# Patient Record
Sex: MALE | Race: BLACK OR AFRICAN AMERICAN | NOT HISPANIC OR LATINO | URBAN - METROPOLITAN AREA
[De-identification: names, ages, dates, MRNs, and addresses within clinical notes are randomized per-mention and may not be internally consistent; named-entity substitution may affect disease eponyms.]

---

## 2019-03-27 ENCOUNTER — INPATIENT (INPATIENT)
Facility: HOSPITAL | Age: 74
LOS: 5 days | Discharge: HOME | DRG: 395 | End: 2019-04-02
Attending: HOSPITALIST | Admitting: HOSPITALIST
Payer: COMMERCIAL

## 2019-03-27 VITALS
RESPIRATION RATE: 18 BRPM | DIASTOLIC BLOOD PRESSURE: 89 MMHG | SYSTOLIC BLOOD PRESSURE: 142 MMHG | HEIGHT: 67 IN | HEART RATE: 89 BPM | TEMPERATURE: 98 F | WEIGHT: 199.96 LBS | OXYGEN SATURATION: 98 %

## 2019-03-27 DIAGNOSIS — I10 ESSENTIAL (PRIMARY) HYPERTENSION: ICD-10-CM

## 2019-03-27 DIAGNOSIS — R22.9 LOCALIZED SWELLING, MASS AND LUMP, UNSPECIFIED: ICD-10-CM

## 2019-03-27 DIAGNOSIS — Z29.9 ENCOUNTER FOR PROPHYLACTIC MEASURES, UNSPECIFIED: ICD-10-CM

## 2019-03-27 DIAGNOSIS — N40.0 BENIGN PROSTATIC HYPERPLASIA WITHOUT LOWER URINARY TRACT SYMPTOMS: ICD-10-CM

## 2019-03-27 DIAGNOSIS — E11.9 TYPE 2 DIABETES MELLITUS WITHOUT COMPLICATIONS: ICD-10-CM

## 2019-03-27 DIAGNOSIS — K63.9 DISEASE OF INTESTINE, UNSPECIFIED: ICD-10-CM

## 2019-03-27 DIAGNOSIS — I48.91 UNSPECIFIED ATRIAL FIBRILLATION: ICD-10-CM

## 2019-03-27 LAB
ALBUMIN SERPL ELPH-MCNC: 3.8 G/DL — SIGNIFICANT CHANGE UP (ref 3.5–5)
ALP SERPL-CCNC: 66 U/L — SIGNIFICANT CHANGE UP (ref 40–120)
ALT FLD-CCNC: 26 U/L DA — SIGNIFICANT CHANGE UP (ref 10–60)
ANION GAP SERPL CALC-SCNC: 7 MMOL/L — SIGNIFICANT CHANGE UP (ref 5–17)
APTT BLD: 40.6 SEC — HIGH (ref 27.5–36.3)
AST SERPL-CCNC: 16 U/L — SIGNIFICANT CHANGE UP (ref 10–40)
BASOPHILS # BLD AUTO: 0.02 K/UL — SIGNIFICANT CHANGE UP (ref 0–0.2)
BASOPHILS NFR BLD AUTO: 0.3 % — SIGNIFICANT CHANGE UP (ref 0–2)
BILIRUB SERPL-MCNC: 1.2 MG/DL — SIGNIFICANT CHANGE UP (ref 0.2–1.2)
BUN SERPL-MCNC: 13 MG/DL — SIGNIFICANT CHANGE UP (ref 7–18)
CALCIUM SERPL-MCNC: 9.1 MG/DL — SIGNIFICANT CHANGE UP (ref 8.4–10.5)
CHLORIDE SERPL-SCNC: 109 MMOL/L — HIGH (ref 96–108)
CO2 SERPL-SCNC: 25 MMOL/L — SIGNIFICANT CHANGE UP (ref 22–31)
CREAT SERPL-MCNC: 1.11 MG/DL — SIGNIFICANT CHANGE UP (ref 0.5–1.3)
DIGOXIN SERPL-MCNC: <0.1 NG/ML — LOW (ref 0.8–2)
EOSINOPHIL # BLD AUTO: 0.02 K/UL — SIGNIFICANT CHANGE UP (ref 0–0.5)
EOSINOPHIL NFR BLD AUTO: 0.3 % — SIGNIFICANT CHANGE UP (ref 0–6)
GLUCOSE SERPL-MCNC: 105 MG/DL — HIGH (ref 70–99)
HCT VFR BLD CALC: 41.6 % — SIGNIFICANT CHANGE UP (ref 39–50)
HGB BLD-MCNC: 13.9 G/DL — SIGNIFICANT CHANGE UP (ref 13–17)
IMM GRANULOCYTES NFR BLD AUTO: 0.8 % — SIGNIFICANT CHANGE UP (ref 0–1.5)
INR BLD: 2.71 RATIO — HIGH (ref 0.88–1.16)
LIDOCAIN IGE QN: 147 U/L — SIGNIFICANT CHANGE UP (ref 73–393)
LYMPHOCYTES # BLD AUTO: 0.89 K/UL — LOW (ref 1–3.3)
LYMPHOCYTES # BLD AUTO: 11.9 % — LOW (ref 13–44)
MCHC RBC-ENTMCNC: 29.7 PG — SIGNIFICANT CHANGE UP (ref 27–34)
MCHC RBC-ENTMCNC: 33.4 GM/DL — SIGNIFICANT CHANGE UP (ref 32–36)
MCV RBC AUTO: 88.9 FL — SIGNIFICANT CHANGE UP (ref 80–100)
MONOCYTES # BLD AUTO: 1.08 K/UL — HIGH (ref 0–0.9)
MONOCYTES NFR BLD AUTO: 14.5 % — HIGH (ref 2–14)
NEUTROPHILS # BLD AUTO: 5.39 K/UL — SIGNIFICANT CHANGE UP (ref 1.8–7.4)
NEUTROPHILS NFR BLD AUTO: 72.2 % — SIGNIFICANT CHANGE UP (ref 43–77)
NRBC # BLD: 0 /100 WBCS — SIGNIFICANT CHANGE UP (ref 0–0)
PLATELET # BLD AUTO: 60 K/UL — LOW (ref 150–400)
POTASSIUM SERPL-MCNC: 4.1 MMOL/L — SIGNIFICANT CHANGE UP (ref 3.5–5.3)
POTASSIUM SERPL-SCNC: 4.1 MMOL/L — SIGNIFICANT CHANGE UP (ref 3.5–5.3)
PROT SERPL-MCNC: 7.8 G/DL — SIGNIFICANT CHANGE UP (ref 6–8.3)
PROTHROM AB SERPL-ACNC: 31 SEC — HIGH (ref 10–12.9)
RBC # BLD: 4.68 M/UL — SIGNIFICANT CHANGE UP (ref 4.2–5.8)
RBC # FLD: 15 % — HIGH (ref 10.3–14.5)
SODIUM SERPL-SCNC: 141 MMOL/L — SIGNIFICANT CHANGE UP (ref 135–145)
TROPONIN I SERPL-MCNC: <0.015 NG/ML — SIGNIFICANT CHANGE UP (ref 0–0.04)
WBC # BLD: 7.46 K/UL — SIGNIFICANT CHANGE UP (ref 3.8–10.5)
WBC # FLD AUTO: 7.46 K/UL — SIGNIFICANT CHANGE UP (ref 3.8–10.5)

## 2019-03-27 PROCEDURE — 99223 1ST HOSP IP/OBS HIGH 75: CPT | Mod: GC

## 2019-03-27 PROCEDURE — 93010 ELECTROCARDIOGRAM REPORT: CPT

## 2019-03-27 PROCEDURE — 99285 EMERGENCY DEPT VISIT HI MDM: CPT

## 2019-03-27 PROCEDURE — 74177 CT ABD & PELVIS W/CONTRAST: CPT | Mod: 26

## 2019-03-27 RX ORDER — ATORVASTATIN CALCIUM 80 MG/1
20 TABLET, FILM COATED ORAL AT BEDTIME
Qty: 0 | Refills: 0 | Status: DISCONTINUED | OUTPATIENT
Start: 2019-03-27 | End: 2019-04-02

## 2019-03-27 RX ORDER — WARFARIN SODIUM 2.5 MG/1
1 TABLET ORAL
Qty: 0 | Refills: 0 | COMMUNITY

## 2019-03-27 RX ORDER — TAMSULOSIN HYDROCHLORIDE 0.4 MG/1
0.4 CAPSULE ORAL AT BEDTIME
Qty: 0 | Refills: 0 | Status: DISCONTINUED | OUTPATIENT
Start: 2019-03-27 | End: 2019-04-02

## 2019-03-27 RX ORDER — SOD SULF/SODIUM/NAHCO3/KCL/PEG
4000 SOLUTION, RECONSTITUTED, ORAL ORAL ONCE
Qty: 0 | Refills: 0 | Status: DISCONTINUED | OUTPATIENT
Start: 2019-03-27 | End: 2019-03-28

## 2019-03-27 RX ORDER — TAMSULOSIN HYDROCHLORIDE 0.4 MG/1
1 CAPSULE ORAL
Qty: 0 | Refills: 0 | COMMUNITY

## 2019-03-27 RX ORDER — FINASTERIDE 5 MG/1
5 TABLET, FILM COATED ORAL DAILY
Qty: 0 | Refills: 0 | Status: DISCONTINUED | OUTPATIENT
Start: 2019-03-27 | End: 2019-04-02

## 2019-03-27 RX ORDER — INSULIN LISPRO 100/ML
VIAL (ML) SUBCUTANEOUS
Qty: 0 | Refills: 0 | Status: DISCONTINUED | OUTPATIENT
Start: 2019-03-27 | End: 2019-04-02

## 2019-03-27 RX ORDER — METFORMIN HYDROCHLORIDE 850 MG/1
1 TABLET ORAL
Qty: 0 | Refills: 0 | COMMUNITY

## 2019-03-27 RX ORDER — DILTIAZEM HCL 120 MG
120 CAPSULE, EXT RELEASE 24 HR ORAL DAILY
Qty: 0 | Refills: 0 | Status: DISCONTINUED | OUTPATIENT
Start: 2019-03-27 | End: 2019-04-02

## 2019-03-27 RX ORDER — FINASTERIDE 5 MG/1
1 TABLET, FILM COATED ORAL
Qty: 0 | Refills: 0 | COMMUNITY

## 2019-03-27 RX ORDER — DILTIAZEM HCL 120 MG
1 CAPSULE, EXT RELEASE 24 HR ORAL
Qty: 0 | Refills: 0 | COMMUNITY

## 2019-03-27 RX ORDER — METOPROLOL TARTRATE 50 MG
200 TABLET ORAL
Qty: 0 | Refills: 0 | Status: DISCONTINUED | OUTPATIENT
Start: 2019-03-27 | End: 2019-04-02

## 2019-03-27 RX ORDER — PIOGLITAZONE HYDROCHLORIDE 15 MG/1
1 TABLET ORAL
Qty: 0 | Refills: 0 | COMMUNITY

## 2019-03-27 RX ORDER — METOPROLOL TARTRATE 50 MG
2 TABLET ORAL
Qty: 0 | Refills: 0 | COMMUNITY

## 2019-03-27 RX ORDER — ONDANSETRON 8 MG/1
4 TABLET, FILM COATED ORAL ONCE
Qty: 0 | Refills: 0 | Status: COMPLETED | OUTPATIENT
Start: 2019-03-27 | End: 2019-03-27

## 2019-03-27 RX ORDER — MORPHINE SULFATE 50 MG/1
2 CAPSULE, EXTENDED RELEASE ORAL ONCE
Qty: 0 | Refills: 0 | Status: DISCONTINUED | OUTPATIENT
Start: 2019-03-27 | End: 2019-03-27

## 2019-03-27 RX ORDER — ATORVASTATIN CALCIUM 80 MG/1
1 TABLET, FILM COATED ORAL
Qty: 0 | Refills: 0 | COMMUNITY

## 2019-03-27 RX ADMIN — ONDANSETRON 4 MILLIGRAM(S): 8 TABLET, FILM COATED ORAL at 17:00

## 2019-03-27 RX ADMIN — MORPHINE SULFATE 2 MILLIGRAM(S): 50 CAPSULE, EXTENDED RELEASE ORAL at 17:30

## 2019-03-27 RX ADMIN — MORPHINE SULFATE 2 MILLIGRAM(S): 50 CAPSULE, EXTENDED RELEASE ORAL at 17:00

## 2019-03-27 NOTE — ED ADULT NURSE NOTE - NSIMPLEMENTINTERV_GEN_ALL_ED
Implemented All Universal Safety Interventions:  [place] to call system. Call bell, personal items and telephone within reach. Instruct patient to call for assistance. Room bathroom lighting operational. Non-slip footwear when patient is off stretcher. Physically safe environment: no spills, clutter or unnecessary equipment. Stretcher in lowest position, wheels locked, appropriate side rails in place.

## 2019-03-27 NOTE — H&P ADULT - NSHPPHYSICALEXAM_GEN_ALL_CORE
Vital Signs Last 24 Hrs  T(C): 36.8 (27 Mar 2019 14:07), Max: 36.8 (27 Mar 2019 14:07)  T(F): 98.2 (27 Mar 2019 14:07), Max: 98.2 (27 Mar 2019 14:07)  HR: 89 (27 Mar 2019 14:07) (89 - 89)  BP: 142/89 (27 Mar 2019 14:07) (142/89 - 142/89)  RR: 18 (27 Mar 2019 14:07) (18 - 18)  SpO2: 98% (27 Mar 2019 14:07) (98% - 98%)  .  GENERAL: Well developed, Elderly male, NAD  HEENT:  Normocephalic/Atraumatic, reactive light reflex, moist mucous membranes  NECK: Supple, no JVD  RESP: Symmetric movement of the chest, clear to auscultation bilaterally  CVS: + irregular rate and rhythm, S1 and S2 audible, no murmur, rubs or gallops noted  GI: Hypoactive bowel sounds present, abdomen soft, + tender, non distended  EXTREMITIES:  1+ edema, no clubbing, cyanosis , +varicosities/PVD noted  MSK: 5/5 strength bilateral upper and lower extremities, intact sensations to light & crude touch  PSYCH: Normal mood, normal affect observed    NEURO: Alert and oriented x 3

## 2019-03-27 NOTE — ED PROVIDER NOTE - PROGRESS NOTE DETAILS
dw rads, appears to have cecal mass. pt will be admit for not taking po / cannot take his meds, needs prompt gi eval

## 2019-03-27 NOTE — H&P ADULT - ASSESSMENT
73 Male with PMH of Afib (Warfarin 6mg), HTN, DM, BPH came to hospital for abdominal pain and vomiting for 3 days. Admitted to medicine for Ascending colon mass vs fecal matter.

## 2019-03-27 NOTE — ED ADULT NURSE NOTE - CHPI ED NUR SYMPTOMS NEG
no abdominal distension/no burning urination/no chills/no hematuria/no dysuria/no blood in stool/no diarrhea

## 2019-03-27 NOTE — H&P ADULT - ATTENDING COMMENTS
Patient seen and examined at bedside, feels ok. States not be able to eat or tolerate any food or drinks since 3 days. Recent weight loss of around 10 lbs but cannot state a certain time frame. He denies seeing any blood in stools or pencil like stools, no family history of any colon cancer. Recent colonoscopy in 2015 and was negative for any finding.   Physical exam:  Vital Signs Last 24 Hrs  T(C): 36.8 (28 Mar 2019 04:54), Max: 37.7 (28 Mar 2019 02:49)  T(F): 98.2 (28 Mar 2019 04:54), Max: 99.8 (28 Mar 2019 02:49)  HR: 93 (28 Mar 2019 04:54) (89 - 97)  BP: 124/60 (28 Mar 2019 04:54) (122/82 - 156/79)  BP(mean): --  RR: 17 (28 Mar 2019 04:54) (17 - 18)  SpO2: 100% (28 Mar 2019 04:54) (97% - 100%)  HEENT: Neck supple  heart: S1 S2 normal  Abdomen: Tender at the right quadrant.   Chest: clear  LE: No LE edema  A/P  1- Abnormal CT abdomen: fecal impacted or a mass  Preping over 2 days, GI consult  Probably colonoscopy on Friday.    2- Afib: CHADSVASC 3:  based on above will not bridge in anticipation of biopsy. Risks of bleeding outweighs risks of having a stroke.    Rest as above.

## 2019-03-27 NOTE — H&P ADULT - PROBLEM SELECTOR PLAN 6
Improve VTE score: Goal INR 2-3 with Warfarin 3mg  [ ] Previous VTE                                               3  [ ] Thrombophilia                                             2  [ ] Lower limb paralysis                                   2    [ ] Current Cancer                                           2   [x] Immobilization > 24 hrs                              1  [ ] ICU/CCU stay > 24 hours                            1  [x] Age > 60                                                       1

## 2019-03-27 NOTE — H&P ADULT - HISTORY OF PRESENT ILLNESS
73 Male with PMH of Afib (Warfarin 6mg), HTN, DM, BPH came to hospital for abdominal pain and vomiting for 3 days.     SH: lives with wife, ambulatory. No smoking or alcohol    ED Course: hemodynamically stable. Labs showed INR of 2.71 and normal cardiac enzymes. Ct abdomen showed Hepatic steatosis, splenomegaly, ascending colon mass vs fecal impaction. 73 Male with PMH of Afib (Warfarin 6mg), HTN, DM, BPH came to hospital for abdominal pain and vomiting for 3 days. Symptoms are also associated with nausea and subjective fevers at home. Pain is generalized diffusely all over the abdomen. Pt last had BM 3 days ago. No family history of cancer, weight loss, sick contacts, chest pain, shortness of breath, problems with urine.     SH: lives with wife, ambulatory. No smoking or alcohol    ED Course: hemodynamically stable. Labs showed INR of 2.71 and normal cardiac enzymes. Ct abdomen showed Hepatic steatosis, splenomegaly, ascending colon mass vs fecal impaction. 73 Male with PMH of Afib (Warfarin 6mg), HTN, DM, BPH came to hospital for abdominal pain and vomiting for 3 days. Symptoms are also associated with nausea and subjective fevers at home. Pain is generalized diffusely all over the abdomen. Pt last had BM 3 days ago. No family history of cancer, weight loss, sick contacts, chest pain, shortness of breath, problems with urine.     SH: lives with wife, ambulatory. No smoking or alcohol    ED Course: Hemodynamically stable. Labs showed INR of 2.71 and normal cardiac enzymes. Ct abdomen showed Hepatic steatosis, splenomegaly, ascending colon mass vs fecal impaction.

## 2019-03-27 NOTE — H&P ADULT - PROBLEM SELECTOR PLAN 2
Rate control with Cardizem and Metoprolol   CLARISSA vasc: 3  Pt takes Warfarin 6mg daily, Holding for the anticipation of colon biopsy

## 2019-03-27 NOTE — ED PROVIDER NOTE - CARE PLAN
Principal Discharge DX:	Mass  Goal:	in cecum  Assessment and plan of treatment:	failure to tolerate po

## 2019-03-27 NOTE — H&P ADULT - PROBLEM SELECTOR PLAN 1
Diffuse abdominal pain and vomiting for 3 days  No weight loss, last BM 3 days ago  Hypoactive bowel sounds  Ct abdomen: ascending colon mass vs fecal impaction  Started Golytely for suspicion of stool and preparation of C-scope  Consulted Dr Nuñez

## 2019-03-27 NOTE — ED PROVIDER NOTE - OBJECTIVE STATEMENT
73 male follows with [facility] care has copd, pulmonary heart disease, atrial fibrillation, htn, itp, bph, on dig and coumadin, no surg hx, comes in w 2-3 days no appetite associated w mild abdominal pain and occ vomiting nbnb. no fever or chills. no cp or sob. no pleuritic. sick contacts, no diarrhea.

## 2019-03-27 NOTE — ED ADULT NURSE NOTE - OBJECTIVE STATEMENT
pt came in w c/o RLQ abdominal pain assoc with n/v/d & subjective fevers. C/o nausea at present, no active emesis. Denies cp/sob. breathing unlabored. NAD

## 2019-03-27 NOTE — ED PROVIDER NOTE - CARDIAC, MLM
Normal rate, regular rhythm.  Heart sounds S1, S2.  No murmurs, rubs or gallops. Normal rate, irregular rhythm.  Heart sounds S1, S2.  No murmurs, rubs or gallops.

## 2019-03-28 ENCOUNTER — TRANSCRIPTION ENCOUNTER (OUTPATIENT)
Age: 74
End: 2019-03-28

## 2019-03-28 DIAGNOSIS — R93.89 ABNORMAL FINDINGS ON DIAGNOSTIC IMAGING OF OTHER SPECIFIED BODY STRUCTURES: ICD-10-CM

## 2019-03-28 DIAGNOSIS — D69.6 THROMBOCYTOPENIA, UNSPECIFIED: ICD-10-CM

## 2019-03-28 LAB
ALBUMIN SERPL ELPH-MCNC: 3.6 G/DL — SIGNIFICANT CHANGE UP (ref 3.5–5)
ALP SERPL-CCNC: 61 U/L — SIGNIFICANT CHANGE UP (ref 40–120)
ALT FLD-CCNC: 22 U/L DA — SIGNIFICANT CHANGE UP (ref 10–60)
ANION GAP SERPL CALC-SCNC: 6 MMOL/L — SIGNIFICANT CHANGE UP (ref 5–17)
APPEARANCE UR: CLEAR — SIGNIFICANT CHANGE UP
APTT BLD: 38.6 SEC — HIGH (ref 27.5–36.3)
AST SERPL-CCNC: 12 U/L — SIGNIFICANT CHANGE UP (ref 10–40)
BACTERIA # UR AUTO: ABNORMAL /HPF
BASOPHILS # BLD AUTO: 0.02 K/UL — SIGNIFICANT CHANGE UP (ref 0–0.2)
BASOPHILS NFR BLD AUTO: 0.3 % — SIGNIFICANT CHANGE UP (ref 0–2)
BILIRUB SERPL-MCNC: 1.4 MG/DL — HIGH (ref 0.2–1.2)
BILIRUB UR-MCNC: NEGATIVE — SIGNIFICANT CHANGE UP
BUN SERPL-MCNC: 13 MG/DL — SIGNIFICANT CHANGE UP (ref 7–18)
CALCIUM SERPL-MCNC: 9 MG/DL — SIGNIFICANT CHANGE UP (ref 8.4–10.5)
CHLORIDE SERPL-SCNC: 106 MMOL/L — SIGNIFICANT CHANGE UP (ref 96–108)
CHOLEST SERPL-MCNC: 80 MG/DL — SIGNIFICANT CHANGE UP (ref 10–199)
CO2 SERPL-SCNC: 28 MMOL/L — SIGNIFICANT CHANGE UP (ref 22–31)
COLOR SPEC: YELLOW — SIGNIFICANT CHANGE UP
CREAT SERPL-MCNC: 1.1 MG/DL — SIGNIFICANT CHANGE UP (ref 0.5–1.3)
DIFF PNL FLD: ABNORMAL
EOSINOPHIL # BLD AUTO: 0.02 K/UL — SIGNIFICANT CHANGE UP (ref 0–0.5)
EOSINOPHIL NFR BLD AUTO: 0.3 % — SIGNIFICANT CHANGE UP (ref 0–6)
EPI CELLS # UR: SIGNIFICANT CHANGE UP /HPF
FOLATE SERPL-MCNC: 11.5 NG/ML — SIGNIFICANT CHANGE UP
GLUCOSE SERPL-MCNC: 105 MG/DL — HIGH (ref 70–99)
GLUCOSE UR QL: NEGATIVE — SIGNIFICANT CHANGE UP
HBA1C BLD-MCNC: 6.3 % — HIGH (ref 4–5.6)
HCT VFR BLD CALC: 41.1 % — SIGNIFICANT CHANGE UP (ref 39–50)
HDLC SERPL-MCNC: 35 MG/DL — LOW
HGB BLD-MCNC: 13.7 G/DL — SIGNIFICANT CHANGE UP (ref 13–17)
IMM GRANULOCYTES NFR BLD AUTO: 1.7 % — HIGH (ref 0–1.5)
INR BLD: 3.07 RATIO — HIGH (ref 0.88–1.16)
KETONES UR-MCNC: NEGATIVE — SIGNIFICANT CHANGE UP
LEUKOCYTE ESTERASE UR-ACNC: NEGATIVE — SIGNIFICANT CHANGE UP
LIPID PNL WITH DIRECT LDL SERPL: 35 MG/DL — SIGNIFICANT CHANGE UP
LYMPHOCYTES # BLD AUTO: 0.95 K/UL — LOW (ref 1–3.3)
LYMPHOCYTES # BLD AUTO: 12.6 % — LOW (ref 13–44)
MAGNESIUM SERPL-MCNC: 1.9 MG/DL — SIGNIFICANT CHANGE UP (ref 1.6–2.6)
MCHC RBC-ENTMCNC: 29.8 PG — SIGNIFICANT CHANGE UP (ref 27–34)
MCHC RBC-ENTMCNC: 33.3 GM/DL — SIGNIFICANT CHANGE UP (ref 32–36)
MCV RBC AUTO: 89.5 FL — SIGNIFICANT CHANGE UP (ref 80–100)
MONOCYTES # BLD AUTO: 1.14 K/UL — HIGH (ref 0–0.9)
MONOCYTES NFR BLD AUTO: 15.1 % — HIGH (ref 2–14)
NEUTROPHILS # BLD AUTO: 5.27 K/UL — SIGNIFICANT CHANGE UP (ref 1.8–7.4)
NEUTROPHILS NFR BLD AUTO: 70 % — SIGNIFICANT CHANGE UP (ref 43–77)
NITRITE UR-MCNC: NEGATIVE — SIGNIFICANT CHANGE UP
NRBC # BLD: 0 /100 WBCS — SIGNIFICANT CHANGE UP (ref 0–0)
PH UR: 6 — SIGNIFICANT CHANGE UP (ref 5–8)
PHOSPHATE SERPL-MCNC: 3.8 MG/DL — SIGNIFICANT CHANGE UP (ref 2.5–4.5)
PLATELET # BLD AUTO: 71 K/UL — LOW (ref 150–400)
POTASSIUM SERPL-MCNC: 3.6 MMOL/L — SIGNIFICANT CHANGE UP (ref 3.5–5.3)
POTASSIUM SERPL-SCNC: 3.6 MMOL/L — SIGNIFICANT CHANGE UP (ref 3.5–5.3)
PROT SERPL-MCNC: 7.6 G/DL — SIGNIFICANT CHANGE UP (ref 6–8.3)
PROT UR-MCNC: 100
PROTHROM AB SERPL-ACNC: 35.3 SEC — HIGH (ref 10–12.9)
RBC # BLD: 4.59 M/UL — SIGNIFICANT CHANGE UP (ref 4.2–5.8)
RBC # FLD: 15 % — HIGH (ref 10.3–14.5)
RBC CASTS # UR COMP ASSIST: ABNORMAL /HPF (ref 0–2)
SODIUM SERPL-SCNC: 140 MMOL/L — SIGNIFICANT CHANGE UP (ref 135–145)
SP GR SPEC: 1.01 — SIGNIFICANT CHANGE UP (ref 1.01–1.02)
TOTAL CHOLESTEROL/HDL RATIO MEASUREMENT: 2.3 RATIO — LOW (ref 3.4–9.6)
TRIGL SERPL-MCNC: 52 MG/DL — SIGNIFICANT CHANGE UP (ref 10–149)
TSH SERPL-MCNC: 0.26 UU/ML — LOW (ref 0.34–4.82)
UROBILINOGEN FLD QL: 4
VIT B12 SERPL-MCNC: 370 PG/ML — SIGNIFICANT CHANGE UP (ref 232–1245)
WBC # BLD: 7.53 K/UL — SIGNIFICANT CHANGE UP (ref 3.8–10.5)
WBC # FLD AUTO: 7.53 K/UL — SIGNIFICANT CHANGE UP (ref 3.8–10.5)
WBC UR QL: SIGNIFICANT CHANGE UP /HPF (ref 0–5)

## 2019-03-28 PROCEDURE — 99233 SBSQ HOSP IP/OBS HIGH 50: CPT | Mod: GC

## 2019-03-28 PROCEDURE — 71045 X-RAY EXAM CHEST 1 VIEW: CPT | Mod: 26

## 2019-03-28 PROCEDURE — 99222 1ST HOSP IP/OBS MODERATE 55: CPT

## 2019-03-28 RX ORDER — SODIUM CHLORIDE 9 MG/ML
1000 INJECTION INTRAMUSCULAR; INTRAVENOUS; SUBCUTANEOUS
Qty: 0 | Refills: 0 | Status: DISCONTINUED | OUTPATIENT
Start: 2019-03-28 | End: 2019-03-29

## 2019-03-28 RX ORDER — MULTIVIT WITH MIN/MFOLATE/K2 340-15/3 G
1 POWDER (GRAM) ORAL ONCE
Qty: 0 | Refills: 0 | Status: COMPLETED | OUTPATIENT
Start: 2019-03-28 | End: 2019-03-28

## 2019-03-28 RX ORDER — ACETAMINOPHEN 500 MG
650 TABLET ORAL EVERY 6 HOURS
Qty: 0 | Refills: 0 | Status: DISCONTINUED | OUTPATIENT
Start: 2019-03-28 | End: 2019-04-02

## 2019-03-28 RX ADMIN — Medication 200 MILLIGRAM(S): at 00:16

## 2019-03-28 RX ADMIN — Medication 650 MILLIGRAM(S): at 23:38

## 2019-03-28 RX ADMIN — FINASTERIDE 5 MILLIGRAM(S): 5 TABLET, FILM COATED ORAL at 00:15

## 2019-03-28 RX ADMIN — ATORVASTATIN CALCIUM 20 MILLIGRAM(S): 80 TABLET, FILM COATED ORAL at 23:09

## 2019-03-28 RX ADMIN — Medication 650 MILLIGRAM(S): at 23:08

## 2019-03-28 RX ADMIN — Medication 1 BOTTLE: at 13:38

## 2019-03-28 RX ADMIN — FINASTERIDE 5 MILLIGRAM(S): 5 TABLET, FILM COATED ORAL at 13:39

## 2019-03-28 RX ADMIN — TAMSULOSIN HYDROCHLORIDE 0.4 MILLIGRAM(S): 0.4 CAPSULE ORAL at 00:15

## 2019-03-28 RX ADMIN — Medication 120 MILLIGRAM(S): at 00:16

## 2019-03-28 RX ADMIN — SODIUM CHLORIDE 70 MILLILITER(S): 9 INJECTION INTRAMUSCULAR; INTRAVENOUS; SUBCUTANEOUS at 17:06

## 2019-03-28 RX ADMIN — TAMSULOSIN HYDROCHLORIDE 0.4 MILLIGRAM(S): 0.4 CAPSULE ORAL at 23:09

## 2019-03-28 RX ADMIN — Medication 120 MILLIGRAM(S): at 05:01

## 2019-03-28 RX ADMIN — ATORVASTATIN CALCIUM 20 MILLIGRAM(S): 80 TABLET, FILM COATED ORAL at 00:15

## 2019-03-28 RX ADMIN — Medication 200 MILLIGRAM(S): at 05:01

## 2019-03-28 NOTE — PROGRESS NOTE ADULT - PROBLEM SELECTOR PLAN 2
Rate control with Cardizem and Metoprolol   CLARISSA vasc: 3  Pt takes Warfarin 6mg daily, Holding for the anticipation of colon biopsy, currently INR therapeutic Rate control with Cardizem and Metoprolol   CLARISSA vasc: 3  Pt takes Warfarin 6mg daily, Holding for the anticipation of colon biopsy, currently INR therapeutic 3.07  TSH low to 0.26, will check the free t4 Rate control with Cardizem and Metoprolol   CLARISSA vasc: 3  Hold coumadin   TSH low to 0.26, will check the free t4

## 2019-03-28 NOTE — DISCHARGE NOTE PROVIDER - HOSPITAL COURSE
73 Male with PMH of Afib (Warfarin 6mg), HTN, DM, BPH came to hospital for abdominal pain and vomiting for 3 days. Admitted to medicine for Ascending colon mass vs fecal matter. CT scan with  ascending colon mass vs fecal impaction, 3.5 cm. Hepatic steatosis. Consulted Dr Nuñez, recommended for -------------------. CT scan also found to have indeterminate 3.5 cm right middle to lower pole parapelvic renal lesion, left kidney with similar lesion. Found to have borderline splenomegaly, with thrombocytopenia.  Heme/Onc consulted--------------.  For afib home meds continued, coumadin kept on hold for procedure. ----------TSH low, free T4 with---------------. For HTN, DM, HLD, home meds continued. CT scan with fibrotic lung changes, CXR shows------------. 73 Male with PMH of Afib (Warfarin 6mg), HTN, DM, BPH came to hospital for abdominal pain and vomiting for 3 days. Admitted to medicine for Ascending colon mass vs fecal matter. CT scan with  ascending colon mass vs fecal impaction, 3.5 cm. Hepatic steatosis. Consulted Dr Nuñez, recommended for colonoscopy. Patient INR was high so procedure was done when it was less than 1.5. Repeat Ct scan was negative for that ascending colon mass, it was suspicion that it was likely the stool mass that was passed.. Colonoscopy done on 4/1.  shows possible cecal malignancy vs ischemic ulcer, colon polyp, biopsy was sent. Need to f/up with pcp for biopsy results. Patient started on coumadin next day. CT scan also found to have indeterminate 3.5 cm right middle to lower pole parapelvic renal lesion, left kidney with similar lesion, repeat ct scan with similar lesions, need outpatient f/up.  Found to have borderline splenomegaly, with thrombocytopenia. Heme/Onc consulted, work up for thrombocytopenia sent. Vitamin b12 370, MMA sent for w/up/  folate 11, hep panel neg, iron panel wnl,  negative HIV panel. Thrombocytopenia looks more chronic. CEA was within normal limits. Ct chest with chronic bibasilar fibrosis with honeycombing. Bilateral lower lobe bronchiectasis. Small right pleural effusion. Also found to have Indeterminate 1.5 cm right adrenal nodule, likely adenoma, patient is currently asymptomatic, but hormonal w./up sent. For afib, cardizem and lopressor continued, coumadin was kept on hold for procedure, later after procedure was restarted. INR on discharge was 2.18. Platelets was 76. On CT scan also have 1.7 cm left substernal thyroid nodule, TSH was low but free T4 wnl, need to repeat TFTS in 4 weeks. For HTN, DM, HLD, home meds continued. Patient condition improved, need to f/up biopsy outpatient, need to check INR regularly, pcp f/up in 1 weeks. Patient stable for discharge, plan discussed with attending.

## 2019-03-28 NOTE — CONSULT NOTE ADULT - SUBJECTIVE AND OBJECTIVE BOX
Patient is a 73y old  Male who presents with a chief complaint of Abdominal pain and vomiting (28 Mar 2019 12:33)    73 Male with PMH of Afib (Warfarin 6mg), HTN, DM, BPH came to hospital for abdominal pain and vomiting for 3 days. Symptoms are also associated with nausea and subjective fevers at home. Pain is generalized diffusely all over the abdomen. Pt last had BM 3 days ago. No family history of cancer, weight loss, sick contacts, chest pain, shortness of breath, problems with urine.     SH: lives with wife, ambulatory. No smoking or alcohol    ED Course: Hemodynamically stable. Labs showed INR of 2.71 and normal cardiac enzymes. Ct abdomen showed Hepatic steatosis, splenomegaly, ascending colon mass vs fecal impaction.       73 year old male with a history of HTN , DM, BPH , presents for pain and vomiting. He also complained of nausea and episodes. He also complained of subjective fevers.     REVIEW OF SYSTEMS  Constitutional:   No fever, no fatigue, no pallor, no night sweats, no weight loss.  HEENT:   No eye pain, no vision changes, no icterus, no mouth ulcers.  Respiratory:   No shortness of breath, no cough, no respiratory distress.   Cardiovascular:   No chest pain, no palpitations.   Gastrointestinal: No abdominal pain, no nausea, no vomiting , no diarrhea no constipation, no hematochezia, no melena.  Skin:   No rashes, no jaundice, no eczema.   Musculoskeletal:   No joint pain, no swelling, no myalgia.       Allergies    No Known Allergies    Intolerances      MEDICATIONS  (STANDING):  atorvastatin 20 milliGRAM(s) Oral at bedtime  diltiazem    milliGRAM(s) Oral daily  finasteride 5 milliGRAM(s) Oral daily  insulin lispro (HumaLOG) corrective regimen sliding scale   SubCutaneous Before meals and at bedtime  metoprolol tartrate 200 milliGRAM(s) Oral two times a day  sodium chloride 0.9%. 1000 milliLiter(s) (70 mL/Hr) IV Continuous <Continuous>  tamsulosin 0.4 milliGRAM(s) Oral at bedtime    MEDICATIONS  (PRN):      PAST MEDICAL & SURGICAL HISTORY:  BPH (benign prostatic hyperplasia)  HTN (hypertension)  Afib    FAMILY HISTORY:    Social History: No history of : Tobacco use, IVDA, EToH  ______________________________________________________________________________________    PHYSICAL EXAM    Daily     Daily   BMI: 31.3 (03-27 @ 14:07)  Change in Weight:  Vital Signs Last 24 Hrs  T(C): 36.7 (28 Mar 2019 14:36), Max: 37.7 (28 Mar 2019 02:49)  T(F): 98 (28 Mar 2019 14:36), Max: 99.8 (28 Mar 2019 02:49)  HR: 86 (28 Mar 2019 14:36) (86 - 97)  BP: 124/68 (28 Mar 2019 14:36) (122/82 - 156/79)  BP(mean): --  RR: 18 (28 Mar 2019 14:36) (17 - 18)  SpO2: 94% (28 Mar 2019 14:36) (94% - 100%)    General:  , NAD.  HEENT:    Normal appearance of conjunctiva, ears, nose, lips, oropharynx, and oral mucosa, anicteric.  Neck:  No masses, no asymmetry.  Lymph Nodes:  No lymphadenopathy.   Cardiovascular:  RRR normal S1/S2, no murmur.  Respiratory:  CTA B/L, normal respiratory effort.   Abdominal:   soft, no masses or tenderness, normoactive BS, NT/ND, no HSM.  Extremities:   No clubbing or cyanosis, normal capillary refill, no edema.   Skin:   No rash, jaundice, lesions, eczema.   ____________________________________________  Lab Results:                          13.7   7.53  )-----------( 71       ( 28 Mar 2019 08:07 )             41.1     03-28    140  |  106  |  13  ----------------------------<  105<H>  3.6   |  28  |  1.10    Ca    9.0      28 Mar 2019 08:07  Phos  3.8     03-28  Mg     1.9     03-28    TPro  7.6  /  Alb  3.6  /  TBili  1.4<H>  /  DBili  x   /  AST  12  /  ALT  22  /  AlkPhos  61  03-28    LIVER FUNCTIONS - ( 28 Mar 2019 08:07 )  Alb: 3.6 g/dL / Pro: 7.6 g/dL / ALK PHOS: 61 U/L / ALT: 22 U/L DA / AST: 12 U/L / GGT: x           PT/INR - ( 28 Mar 2019 08:07 )   PT: 35.3 sec;   INR: 3.07 ratio         PTT - ( 28 Mar 2019 08:07 )  PTT:38.6 sec  Triglycerides, Serum: 52 mg/dL (03-28 @ 08:07)    CARDIAC MARKERS ( 27 Mar 2019 16:21 )  <0.015 ng/mL / x     / x     / x     / x          Stool Results:          RADIOLOGY RESULTS:    EXAM:  CT ABDOMEN AND PELVIS IC                            PROCEDURE DATE:  03/27/2019          INTERPRETATION:  CLINICAL HISTORY: 73 years  Male with 3 days vomit, rlq   pain.    COMPARISON: None    PROCEDURE:   CT of the Abdomen and Pelvis was performed with intravenous contrast.   Intravenous contrast: 90 ml Omnipaque 350. 10 ml discarded.  Oral contrast: None.  Sagittal and coronal reformats were performed.    Evaluation of the GI tract is limited without enteric contrast.    Survey scans through the lung bases demonstrate bibasilar fibrosis with   mild honeycombing, worse on the right..    The liver demonstrates no focal mass or biliary distention. The liver is   low in attenuation secondary to hepatic steatosis.    The pancreas demonstrates no mass or duct distention.    The gallbladder is present.    The spleen is borderline enlarged measuring 12.4 cm sagittal. There is no   focal mass. There is an 8 mm splenule anterior to the spleen.    The adrenal glands are normal without focal nodule.    Within the right mid to lower pole there is a 3.5 cm hypodense mass which   does not measure cyst by CT criteria and is indeterminate as to cystic   versus solid nature.  A right midpole cortical cyst measures 1.8 cm. A   left midpole cortical cyst measures 4.3 cm. A left lower pole cortical   cyst measures 3.4 cm. No hydronephrosis or calculi are present   bilaterally. There is mild bilateral nonspecific perinephric inflammatory   stranding.    There is no retroperitoneal or mesenteric adenopathy and no ascites. The   aorta is atherosclerotic but normal in caliber.    The bowel demonstrates no obstruction or surrounding inflammation. There   is a 3.5 cm soft tissue mass in the proximal ascending colon on series 2   image 68 which may represent neoplasm or fecal matter. The stomach is   decompressed and cannot be assessed. The appendix is normal. There is no   pneumoperitoneum. There is a small fat-containing umbilical hernia.    Within the pelvis, the urinary bladder  is unremarkable. The prostate   gland is mildly enlarged measuring 5.4 x 4.7 cm. are unremarkable. There   is no pelvic adenopathy.    There are small bilateral fat-containing inguinal hernias.    There are moderate thoracolumbar degenerative changes with particularly   large anterior thoracic osteophytes. Bridging osteophytes are present   across both sacroiliac joints as well.    IMPRESSION:    Evaluation of the GI tract is limited without enteric contrast.    Mass versus fecal matter in the mid ascending colon as above. Recommend   colonoscopy.    Normal appendix.    Indeterminant 3.5 cm right mid to lower pole parapelvic renal lesion.   Unclear if the mass is cystic or solid. Recommend pre and postcontrast CT.    Small fat-containing umbilical and bilateral inguinal hernias.    Hepatic steatosis. Borderline splenomegaly.    Fibrotic lung disease.    Findings discussed with Dr. Ivan Gray in the emergency room at 3/27/2019   6:36 PM with readback.                 WAN LAWRENCE M.D., ATTENDING RADIOLOGIST  This document has been electronically signed. Mar 27 2019  6:36PM                SURGICAL PATHOLOGY:

## 2019-03-28 NOTE — DISCHARGE NOTE PROVIDER - CARE PROVIDER_API CALL
Dr Law Alvarado  Phone: [phone]  Fax: (   )    -  Follow Up Time: Dr Law Alvarado  Phone: [phone]  Fax: (   )    -  Follow Up Time:     Terry Guidry)  Family Medicine  [address] [place] [address]  Phone: [phone]  Fax: [phone]  Follow Up Time:

## 2019-03-28 NOTE — PROGRESS NOTE ADULT - SUBJECTIVE AND OBJECTIVE BOX
Patient is a 73y old  Male who presents with a chief complaint of Abdominal pain and vomiting (27 Mar 2019 20:01)      INTERVAL HPI/OVERNIGHT EVENTS: Denies abd pain, no nausea, vomiting, no BM today    MEDICATIONS  (STANDING):  atorvastatin 20 milliGRAM(s) Oral at bedtime  diltiazem    milliGRAM(s) Oral daily  finasteride 5 milliGRAM(s) Oral daily  insulin lispro (HumaLOG) corrective regimen sliding scale   SubCutaneous Before meals and at bedtime  metoprolol tartrate 200 milliGRAM(s) Oral two times a day  polyethylene glycol/electrolyte Solution. 4000 milliLiter(s) Oral once  tamsulosin 0.4 milliGRAM(s) Oral at bedtime    MEDICATIONS  (PRN):      Allergies    No Known Allergies    Intolerances      __________________________________________________  REVIEW OF SYSTEMS:    CONSTITUTIONAL: No fever,   EYES: no acute visual disturbances  NECK: No pain or stiffness  RESPIRATORY: No cough; No shortness of breath  CARDIOVASCULAR: No chest pain, no palpitations  GASTROINTESTINAL: No pain. No nausea or vomiting; No diarrhea       Vital Signs Last 24 Hrs  T(C): 36.8 (28 Mar 2019 04:54), Max: 37.7 (28 Mar 2019 02:49)  T(F): 98.2 (28 Mar 2019 04:54), Max: 99.8 (28 Mar 2019 02:49)  HR: 93 (28 Mar 2019 04:54) (89 - 97)  BP: 124/60 (28 Mar 2019 04:54) (122/82 - 156/79)  BP(mean): --  RR: 17 (28 Mar 2019 04:54) (17 - 18)  SpO2: 100% (28 Mar 2019 04:54) (97% - 100%)    ________________________________________________  PHYSICAL EXAM:  GENERAL: NAD,   HEAD:  , Normocephalic  EYES:  conjunctiva and sclera clear  NECK: Supple, No JVD    NERVOUS SYSTEM:  Alert & Oriented X3,   CHEST/LUNG: Clear to auscuitation bilaterally;   HEART: S1 S2+;   ABDOMEN: Soft, Nontender, Nondistended; Bowel sounds hypoactive  EXTREMITIES: no cyanosis; no edema; no calf tenderness    _________________________________________________  LABS:                        13.7   7.53  )-----------( 71       ( 28 Mar 2019 08:07 )             41.1     03-28    140  |  106  |  13  ----------------------------<  105<H>  3.6   |  28  |  1.10    Ca    9.0      28 Mar 2019 08:07  Phos  3.8     03-28  Mg     1.9     03-28    TPro  7.6  /  Alb  3.6  /  TBili  1.4<H>  /  DBili  x   /  AST  12  /  ALT  22  /  AlkPhos  61  03-28    PT/INR - ( 28 Mar 2019 08:07 )   PT: 35.3 sec;   INR: 3.07 ratio         PTT - ( 28 Mar 2019 08:07 )  PTT:38.6 sec    CAPILLARY BLOOD GLUCOSE      POCT Blood Glucose.: 106 mg/dL (28 Mar 2019 08:23)  POCT Blood Glucose.: 115 mg/dL (27 Mar 2019 23:52)  POCT Blood Glucose.: 102 mg/dL (27 Mar 2019 15:16) Patient is a 73y old  Male who presents with a chief complaint of Abdominal pain and vomiting (27 Mar 2019 20:01)      INTERVAL HPI/ OVERNIGHT EVENTS:   Denies abd pain, no nausea, vomiting, no BM today    MEDICATIONS  (STANDING):  atorvastatin 20 milliGRAM(s) Oral at bedtime  diltiazem    milliGRAM(s) Oral daily  finasteride 5 milliGRAM(s) Oral daily  insulin lispro (HumaLOG) corrective regimen sliding scale   SubCutaneous Before meals and at bedtime  metoprolol tartrate 200 milliGRAM(s) Oral two times a day  polyethylene glycol/electrolyte Solution. 4000 milliLiter(s) Oral once  tamsulosin 0.4 milliGRAM(s) Oral at bedtime    MEDICATIONS  (PRN):    Allergies    No Known Allergies    Intolerances      __________________________________________________  REVIEW OF SYSTEMS:    CONSTITUTIONAL: No fever,   EYES: no acute visual disturbances  NECK: No pain or stiffness  RESPIRATORY: No cough; No shortness of breath  CARDIOVASCULAR: No chest pain, no palpitations  GASTROINTESTINAL: No pain. No nausea or vomiting; No diarrhea       Vital Signs Last 24 Hrs  T(C): 36.8 (28 Mar 2019 04:54), Max: 37.7 (28 Mar 2019 02:49)  T(F): 98.2 (28 Mar 2019 04:54), Max: 99.8 (28 Mar 2019 02:49)  HR: 93 (28 Mar 2019 04:54) (89 - 97)  BP: 124/60 (28 Mar 2019 04:54) (122/82 - 156/79)  BP(mean): --  RR: 17 (28 Mar 2019 04:54) (17 - 18)  SpO2: 100% (28 Mar 2019 04:54) (97% - 100%)    ________________________________________________  PHYSICAL EXAM:  GENERAL: NAD,   HEAD:  , Normocephalic  EYES:  conjunctiva and sclera clear  NECK: Supple, No JVD    NERVOUS SYSTEM:  Alert & Oriented X3,   CHEST/LUNG: Clear to auscuitation bilaterally;   HEART: S1 S2+;   ABDOMEN: Soft, Nontender, Nondistended; Bowel sounds hypoactive  EXTREMITIES: no cyanosis; no edema; no calf tenderness    _________________________________________________  LABS:                        13.7   7.53  )-----------( 71       ( 28 Mar 2019 08:07 )             41.1     03-28    140  |  106  |  13  ----------------------------<  105<H>  3.6   |  28  |  1.10    Ca    9.0      28 Mar 2019 08:07  Phos  3.8     03-28  Mg     1.9     03-28    TPro  7.6  /  Alb  3.6  /  TBili  1.4<H>  /  DBili  x   /  AST  12  /  ALT  22  /  AlkPhos  61  03-28    PT/INR - ( 28 Mar 2019 08:07 )   PT: 35.3 sec;   INR: 3.07 ratio         PTT - ( 28 Mar 2019 08:07 )  PTT:38.6 sec    CAPILLARY BLOOD GLUCOSE      POCT Blood Glucose.: 106 mg/dL (28 Mar 2019 08:23)  POCT Blood Glucose.: 115 mg/dL (27 Mar 2019 23:52)  POCT Blood Glucose.: 102 mg/dL (27 Mar 2019 15:16)

## 2019-03-28 NOTE — PROGRESS NOTE ADULT - PROBLEM SELECTOR PLAN 1
Diffuse abdominal pain and vomiting for 3 days, currently denies any pain  Ct abdomen: ascending colon mass vs fecal impaction  C/w Golytely for suspicion of stool and preparation of C-scope  Consulted Dr Nuñez Diffuse abdominal pain and vomiting for 3 days, currently denies any pain  Ct abdomen: ascending colon mass vs fecal impaction, 3.5 cm?  Hepatic steatosis  C/w Golytely for suspicion of stool and preparation of C-scope  Consulted Dr Nuñez Diffuse abdominal pain and vomiting for 3 days, currently denies any pain  Ct abdomen: ascending colon mass vs fecal impaction, 3.5 cm?  Hepatic steatosis  Consulted Dr Nuñez  Likely colonoscopy plan on Monday, will give Mg citrate today, soft diet, again Mg citrate on sat,  then need to start the GoLYTELY from sunday, clear diet  INR goal around 1.5, c/w to hold coumadin, no need for any platelets or FFP at this point

## 2019-03-28 NOTE — PROGRESS NOTE ADULT - PROBLEM SELECTOR PLAN 7
Improve VTE score: Goal INR 2-3 with Warfarin 3mg  [ ] Previous VTE                                               3  [ ] Thrombophilia                                             2  [ ] Lower limb paralysis                                   2    [ ] Current Cancer                                           2   [x] Immobilization > 24 hrs                              1  [ ] ICU/CCU stay > 24 hours                            1  [x] Age > 60                                                       1 Ct scan abdomen with Fibrotic lung disease.  F/up CXR

## 2019-03-28 NOTE — DISCHARGE NOTE PROVIDER - NSDCCPCAREPLAN_GEN_ALL_CORE_FT
PRINCIPAL DISCHARGE DIAGNOSIS  Diagnosis: Mass  Assessment and Plan of Treatment: Ct scan shows some colon mass, hepatic steasosis, Gi consulted, had colonoscopy on------------, shows----------      SECONDARY DISCHARGE DIAGNOSES  Diagnosis: Kidney lesion  Assessment and Plan of Treatment: Ct scan with kidney lesion Vs cyst in both kidneys, seen by Heme/Oncologist, recommended ---------------- PRINCIPAL DISCHARGE DIAGNOSIS  Diagnosis: Mass  Assessment and Plan of Treatment: You came with abd pain, nausea, found to have ascending colon mass vs fecal matter. You were given laxatives, constipation resolved. GI did colonsocopy when INR was subtherapeutic. Repeat Ct scan was negative for that ascending colon mass, it was suspicion that it was likely the stool mass that was passed., colonoscopy shows possible cecal malignancy vs ischemic ulcer, colon polyp, biopsy was sent., your tumor marker CEA was negative, coumadin was restarted.   Need to f/up pcp for biopsy results   Avoid constipation, take supplememnts / prune juices as needed      SECONDARY DISCHARGE DIAGNOSES  Diagnosis: Adrenal adenoma  Assessment and Plan of Treatment: Also found to have Indeterminate 1.5 cm right adrenal nodule, likely adenoma, you dont have symptoms currently, , but hormonal w./up sent  Need to f/up outpatient    Diagnosis: Kidney lesion  Assessment and Plan of Treatment: CT scan also found to have indeterminate 3.5 cm right middle to lower pole parapelvic renal lesion, left kidney with similar lesion, repeat ct scan with similar lesions, need outpatient f/up.  .  For HTN, DM, HLD, home meds continued. Patient condition improved, need to f/up biopsy outpatient, need to check INR regularly, pcp f/up in 1 weeks. Patient stable for discharge, plan discussed with attending. PRINCIPAL DISCHARGE DIAGNOSIS  Diagnosis: Mass  Assessment and Plan of Treatment: You came with abd pain, nausea, found to have ascending colon mass vs fecal matter. You were given laxatives, constipation resolved. GI did colonsocopy when INR was subtherapeutic. Repeat Ct scan was negative for that ascending colon mass, it was suspicion that it was likely the stool mass that was passed., colonoscopy shows possible cecal malignancy vs ischemic ulcer, colon polyp, biopsy was sent., your tumor marker CEA was negative, coumadin was restarted.   Need to f/up pcp for biopsy results   Avoid constipation, take supplememnts / prune juices as needed      SECONDARY DISCHARGE DIAGNOSES  Diagnosis: Thrombocytopenia  Assessment and Plan of Treatment: Found to have borderline splenomegaly, with low platelets, Heme/Onc consulted, work up for thrombocytopenia sent. Vitamin b12 370, MMA sent for w/up/  folate 11, hep panel neg, iron panel wnl,  negative HIV panel. Thrombocytopenia looks more chronic. It was stable, you did not have any bleeding episodes.   need to repeat cbc in 1 week    Diagnosis: Afib  Assessment and Plan of Treatment: Coumadin kept on hold due to procedure, later it was restarted, c/w taking the coumadin, check INR in 3 days. C.w cardizme and lpressor   On CT scan also have 1.7 cm left substernal thyroid nodule, TSH was low but free T4 wnl, need to repeat TFTS in 4 weeks.    Diagnosis: Adrenal adenoma  Assessment and Plan of Treatment: Also found to have Indeterminate 1.5 cm right adrenal nodule, likely adenoma, you dont have symptoms currently, , but hormonal w./up sent  Need to f/up outpatient    Diagnosis: Kidney lesion  Assessment and Plan of Treatment: CT scan also found to have indeterminate 3.5 cm right middle to lower pole parapelvic renal lesion, left kidney with similar lesion, repeat ct scan with similar lesions, need outpatient f/up.  .  For HTN, DM, HLD, home meds continued. Patient condition improved, need to f/up biopsy outpatient, need to check INR regularly, pcp f/up in 1 weeks. Patient stable for discharge, plan discussed with attending. PRINCIPAL DISCHARGE DIAGNOSIS  Diagnosis: Mass  Assessment and Plan of Treatment: You came with abd pain, nausea, found to have ascending colon mass vs fecal matter. You were given laxatives, constipation resolved. GI did colonsocopy when INR was subtherapeutic. Repeat Ct scan was negative for that ascending colon mass, it was suspicion that it was likely the stool mass that was passed., colonoscopy shows possible cecal malignancy vs ischemic ulcer, colon polyp, biopsy was sent., your tumor marker CEA was negative, coumadin was restarted.   Need to f/up pcp for biopsy results   Avoid constipation, take supplememnts / prune juices as needed      SECONDARY DISCHARGE DIAGNOSES  Diagnosis: Thrombocytopenia  Assessment and Plan of Treatment: Found to have borderline splenomegaly, with low platelets, Heme/Onc consulted, work up for thrombocytopenia sent. Vitamin b12 370, MMA sent for w/up/  folate 11, hep panel neg, iron panel wnl,  negative HIV panel. Thrombocytopenia looks more chronic. It was stable, you did not have any bleeding episodes.   need to repeat cbc in 1 week    Diagnosis: Afib  Assessment and Plan of Treatment: Coumadin kept on hold due to procedure, later it was restarted, c/w taking the coumadin 6 mg, check INR in 3 days. C.w cardizme and lpressor . On CT scan also have 1.7 cm left substernal thyroid nodule, TSH was low but free T4 wnl, need to repeat TFTS in 4 weeks.    Diagnosis: Adrenal adenoma  Assessment and Plan of Treatment: Also found to have Indeterminate 1.5 cm right adrenal nodule, likely adenoma, you dont have symptoms currently, , but hormonal w./up sent  Need to f/up outpatient    Diagnosis: Kidney lesion  Assessment and Plan of Treatment: CT scan also found to have indeterminate 3.5 cm right middle to lower pole parapelvic renal lesion, left kidney with similar lesion, repeat ct scan with similar lesions, need outpatient f/up.        Diagnosis: Diabetes  Assessment and Plan of Treatment: Continue with your home meds

## 2019-03-28 NOTE — PROGRESS NOTE ADULT - PROBLEM SELECTOR PLAN 6
Improve VTE score: Goal INR 2-3 with Warfarin 3mg  [ ] Previous VTE                                               3  [ ] Thrombophilia                                             2  [ ] Lower limb paralysis                                   2    [ ] Current Cancer                                           2   [x] Immobilization > 24 hrs                              1  [ ] ICU/CCU stay > 24 hours                            1  [x] Age > 60                                                       1 Ct scan with borderline splenomegaly , platelets 71  Continue to monitor Ct scan with borderline splenomegaly , platelets 71  Continue to monitor  heme/onc consult Dr Miguel

## 2019-03-28 NOTE — PROGRESS NOTE ADULT - PROBLEM SELECTOR PLAN 8
Improve VTE score: Goal INR 2-3 with Warfarin 3mg  [ ] Previous VTE                                               3  [ ] Thrombophilia                                             2  [ ] Lower limb paralysis                                   2    [ ] Current Cancer                                           2   [x] Immobilization > 24 hrs                              1  [ ] ICU/CCU stay > 24 hours                            1  [x] Age > 60                                                       1 Improve VTE score: Goal INR 2-3 with Warfarin , continue to hold  [ ] Previous VTE                                               3  [ ] Thrombophilia                                             2  [ ] Lower limb paralysis                                   2    [ ] Current Cancer                                           2   [x] Immobilization > 24 hrs                              1  [ ] ICU/CCU stay > 24 hours                            1  [x] Age > 60                                                       1

## 2019-03-28 NOTE — DISCHARGE NOTE PROVIDER - PROVIDER TOKENS
FREE:[LAST:[Surrendra],FIRST:[Dr Foy],PHONE:[phone]],FAX:[(   )    -]] FREE:[LAST:[Surrendra],FIRST:[Dr Foy],PHONE:[phone]:[(   )    -]],PROVIDER:[TOKEN:[[id number]]]

## 2019-03-28 NOTE — PROGRESS NOTE ADULT - PROBLEM SELECTOR PLAN 5
C/w Flomax and Proscar C/w Flomax and Proscar  On CT scan with indeterminate 3.5 cm right middle to lower pole parapelvic renal lesion  F/up UA  Creatinine slightly elevated C/w Flomax and Proscar  On CT scan with indeterminate 3.5 cm right middle to lower pole parapelvic renal lesion, left kidney with similar lesion  F/up UA  Creatinine slightly elevated

## 2019-03-28 NOTE — CONSULT NOTE ADULT - ASSESSMENT
73 year old male presents with new onset of nausea and vomiting.   CT concerning for colon mass.   Also having constipation  Suggest Bowel regimen   Plan for Colonoscopy when INR is 1.5-1.6

## 2019-03-29 LAB
ANION GAP SERPL CALC-SCNC: 5 MMOL/L — SIGNIFICANT CHANGE UP (ref 5–17)
BUN SERPL-MCNC: 15 MG/DL — SIGNIFICANT CHANGE UP (ref 7–18)
CALCIUM SERPL-MCNC: 8.6 MG/DL — SIGNIFICANT CHANGE UP (ref 8.4–10.5)
CEA SERPL-MCNC: 3.5 NG/ML — SIGNIFICANT CHANGE UP (ref 0–3.8)
CHLORIDE SERPL-SCNC: 107 MMOL/L — SIGNIFICANT CHANGE UP (ref 96–108)
CO2 SERPL-SCNC: 29 MMOL/L — SIGNIFICANT CHANGE UP (ref 22–31)
CREAT SERPL-MCNC: 1.04 MG/DL — SIGNIFICANT CHANGE UP (ref 0.5–1.3)
GLUCOSE SERPL-MCNC: 106 MG/DL — HIGH (ref 70–99)
HCT VFR BLD CALC: 40.6 % — SIGNIFICANT CHANGE UP (ref 39–50)
HGB BLD-MCNC: 13.6 G/DL — SIGNIFICANT CHANGE UP (ref 13–17)
INR BLD: 2.44 RATIO — HIGH (ref 0.88–1.16)
MAGNESIUM SERPL-MCNC: 2.1 MG/DL — SIGNIFICANT CHANGE UP (ref 1.6–2.6)
MCHC RBC-ENTMCNC: 29.8 PG — SIGNIFICANT CHANGE UP (ref 27–34)
MCHC RBC-ENTMCNC: 33.5 GM/DL — SIGNIFICANT CHANGE UP (ref 32–36)
MCV RBC AUTO: 88.8 FL — SIGNIFICANT CHANGE UP (ref 80–100)
NRBC # BLD: 0 /100 WBCS — SIGNIFICANT CHANGE UP (ref 0–0)
PHOSPHATE SERPL-MCNC: 3.2 MG/DL — SIGNIFICANT CHANGE UP (ref 2.5–4.5)
PLATELET # BLD AUTO: 63 K/UL — LOW (ref 150–400)
POTASSIUM SERPL-MCNC: 3.6 MMOL/L — SIGNIFICANT CHANGE UP (ref 3.5–5.3)
POTASSIUM SERPL-SCNC: 3.6 MMOL/L — SIGNIFICANT CHANGE UP (ref 3.5–5.3)
PROTHROM AB SERPL-ACNC: 27.9 SEC — HIGH (ref 10–12.9)
RBC # BLD: 4.57 M/UL — SIGNIFICANT CHANGE UP (ref 4.2–5.8)
RBC # FLD: 14.7 % — HIGH (ref 10.3–14.5)
SODIUM SERPL-SCNC: 141 MMOL/L — SIGNIFICANT CHANGE UP (ref 135–145)
T4 AB SER-ACNC: 6.6 UG/DL — SIGNIFICANT CHANGE UP (ref 4.6–12)
T4 FREE SERPL-MCNC: 1.1 NG/DL — SIGNIFICANT CHANGE UP (ref 0.9–1.8)
WBC # BLD: 5.98 K/UL — SIGNIFICANT CHANGE UP (ref 3.8–10.5)
WBC # FLD AUTO: 5.98 K/UL — SIGNIFICANT CHANGE UP (ref 3.8–10.5)

## 2019-03-29 PROCEDURE — 71260 CT THORAX DX C+: CPT | Mod: 26

## 2019-03-29 PROCEDURE — 99232 SBSQ HOSP IP/OBS MODERATE 35: CPT

## 2019-03-29 PROCEDURE — 99233 SBSQ HOSP IP/OBS HIGH 50: CPT | Mod: GC

## 2019-03-29 RX ORDER — MULTIVIT WITH MIN/MFOLATE/K2 340-15/3 G
1 POWDER (GRAM) ORAL ONCE
Qty: 0 | Refills: 0 | Status: COMPLETED | OUTPATIENT
Start: 2019-03-30 | End: 2019-03-30

## 2019-03-29 RX ORDER — ONDANSETRON 8 MG/1
4 TABLET, FILM COATED ORAL EVERY 6 HOURS
Qty: 0 | Refills: 0 | Status: DISCONTINUED | OUTPATIENT
Start: 2019-03-29 | End: 2019-04-02

## 2019-03-29 RX ORDER — SENNA PLUS 8.6 MG/1
2 TABLET ORAL AT BEDTIME
Qty: 0 | Refills: 0 | Status: DISCONTINUED | OUTPATIENT
Start: 2019-03-29 | End: 2019-04-02

## 2019-03-29 RX ORDER — POLYETHYLENE GLYCOL 3350 17 G/17G
17 POWDER, FOR SOLUTION ORAL ONCE
Qty: 0 | Refills: 0 | Status: COMPLETED | OUTPATIENT
Start: 2019-03-29 | End: 2019-03-29

## 2019-03-29 RX ORDER — SOD SULF/SODIUM/NAHCO3/KCL/PEG
4000 SOLUTION, RECONSTITUTED, ORAL ORAL ONCE
Qty: 0 | Refills: 0 | Status: COMPLETED | OUTPATIENT
Start: 2019-03-31 | End: 2019-03-31

## 2019-03-29 RX ORDER — DOCUSATE SODIUM 100 MG
100 CAPSULE ORAL
Qty: 0 | Refills: 0 | Status: DISCONTINUED | OUTPATIENT
Start: 2019-03-29 | End: 2019-04-02

## 2019-03-29 RX ADMIN — Medication 100 MILLIGRAM(S): at 17:35

## 2019-03-29 RX ADMIN — ATORVASTATIN CALCIUM 20 MILLIGRAM(S): 80 TABLET, FILM COATED ORAL at 21:38

## 2019-03-29 RX ADMIN — Medication 200 MILLIGRAM(S): at 05:34

## 2019-03-29 RX ADMIN — FINASTERIDE 5 MILLIGRAM(S): 5 TABLET, FILM COATED ORAL at 12:23

## 2019-03-29 RX ADMIN — POLYETHYLENE GLYCOL 3350 17 GRAM(S): 17 POWDER, FOR SOLUTION ORAL at 12:23

## 2019-03-29 RX ADMIN — Medication 120 MILLIGRAM(S): at 05:34

## 2019-03-29 RX ADMIN — SENNA PLUS 2 TABLET(S): 8.6 TABLET ORAL at 21:38

## 2019-03-29 RX ADMIN — TAMSULOSIN HYDROCHLORIDE 0.4 MILLIGRAM(S): 0.4 CAPSULE ORAL at 21:38

## 2019-03-29 NOTE — CONSULT NOTE ADULT - SUBJECTIVE AND OBJECTIVE BOX
HPI;  73 Male with PMH of Afib (Warfarin 6mg), HTN, DM, BPH came to hospital for abdominal pain and vomiting for 3 days. Symptoms are also associated with nausea and subjective fevers at home. Pain is generalized diffusely all over the abdomen. Pt last had BM 3 days ago. No family history of cancer, weight loss, sick contacts, chest pain, shortness of breath, problems with urine.   Patient was admitted.  Was found to have suspicious colon mass on CT scan.  Plan for undergoing a colonoscopy on Monday.  GI on board.  3/29/2019: I met with the patient is morning.  Mention he has no abdominal pain.  Has not had a bowel movement for 2 days.  No history of blood in the urine or stools.  Patient mentioned used to smoke in his 20s.  He had a colonoscopy many years ago.    ROS: negative except as per HPI.  PMH / PSH: as per HPI.  Medications: Reviewed in EMR.  Allergies: NKDA.  SH: lives with wife, ambulatory. No smoking or alcohol    Vitals:  T(C): 36.9 (29 Mar 2019 13:20), Max: 38.1 (28 Mar 2019 21:32)  T(F): 98.5 (29 Mar 2019 13:20), Max: 100.6 (28 Mar 2019 21:32)  HR: 83 (29 Mar 2019 13:20) (83 - 112)  BP: 116/69 (29 Mar 2019 13:20) (109/55 - 119/85)  BP(mean): --  ABP: --  ABP(mean): --  RR: 16 (29 Mar 2019 13:20) (16 - 17)  SpO2: 98% (29 Mar 2019 13:20) (93% - 98%)    Gen: Alert and Oriented x 3  CVS: s1s2+  Resp: CTABL  GI: NO HSM. soft nontender.  CNS: no focal deficits.    CBC Full  -  ( 29 Mar 2019 07:25 )  WBC Count : 5.98 K/uL  RBC Count : 4.57 M/uL  Hemoglobin : 13.6 g/dL  Hematocrit : 40.6 %  Platelet Count - Automated : 63 K/uL  Mean Cell Volume : 88.8 fl  Mean Cell Hemoglobin : 29.8 pg  Mean Cell Hemoglobin Concentration : 33.5 gm/dL  Auto Neutrophil # : x  Auto Lymphocyte # : x  Auto Monocyte # : x  Auto Eosinophil # : x  Auto Basophil # : x  Auto Neutrophil % : x  Auto Lymphocyte % : x  Auto Monocyte % : x  Auto Eosinophil % : x  Auto Basophil % : x    < from: CT Abdomen and Pelvis w/ IV Cont (03.27.19 @ 18:11) >  IMPRESSION:    Evaluation of the GI tract is limited without enteric contrast.    Mass versus fecal matter in the mid ascending colon as above. Recommend   colonoscopy.    Normal appendix.    Indeterminant 3.5 cm right mid to lower pole parapelvic renal lesion.   Unclear if the mass is cystic or solid. Recommend pre and postcontrast CT.    Small fat-containing umbilical and bilateral inguinal hernias.    Hepatic steatosis. Borderline splenomegaly.    Fibrotic lung disease.    < end of copied text >      < from: CT Chest w/ IV Cont (03.29.19 @ 11:04) >  IMPRESSION:    Chronic bibasilar fibrosis with honeycombing. Bilateral lower lobe   bronchiectasis.    Small right pleural effusion.    1.7 cm left substernal thyroid nodule.    < end of copied text >

## 2019-03-29 NOTE — PROGRESS NOTE ADULT - SUBJECTIVE AND OBJECTIVE BOX
Patient is a 73y old  Male who presents with a chief complaint of Abdominal pain and vomiting (28 Mar 2019 15:42)      INTERVAL HPI/OVERNIGHT EVENTS: NO Nm yesterday, no vomiting but still felling nauseous.     MEDICATIONS  (STANDING):  atorvastatin 20 milliGRAM(s) Oral at bedtime  diltiazem    milliGRAM(s) Oral daily  finasteride 5 milliGRAM(s) Oral daily  insulin lispro (HumaLOG) corrective regimen sliding scale   SubCutaneous Before meals and at bedtime  metoprolol tartrate 200 milliGRAM(s) Oral two times a day  tamsulosin 0.4 milliGRAM(s) Oral at bedtime    MEDICATIONS  (PRN):  acetaminophen   Tablet .. 650 milliGRAM(s) Oral every 6 hours PRN Temp greater or equal to 38C (100.4F)      Allergies    No Known Allergies    Intolerances      __________________________________________________  REVIEW OF SYSTEMS:    CONSTITUTIONAL: No fever,   EYES: no acute visual disturbances  NECK: No pain or stiffness  RESPIRATORY: No cough; No shortness of breath  CARDIOVASCULAR: No chest pain, no palpitations  GASTROINTESTINAL: No pain. No nausea or vomiting; No diarrhea       Vital Signs Last 24 Hrs  T(C): 36.9 (29 Mar 2019 04:35), Max: 38.1 (28 Mar 2019 21:32)  T(F): 98.4 (29 Mar 2019 04:35), Max: 100.6 (28 Mar 2019 21:32)  HR: 112 (29 Mar 2019 04:35) (86 - 112)  BP: 119/85 (29 Mar 2019 04:35) (109/55 - 124/68)  BP(mean): --  RR: 16 (29 Mar 2019 04:35) (16 - 18)  SpO2: 97% (29 Mar 2019 04:35) (93% - 97%)    ________________________________________________  PHYSICAL EXAM:  GENERAL: NAD, awake   HEAD:  , Normocephalic  EYES:  conjunctiva and sclera clear  NECK: Supple, No JVD    NERVOUS SYSTEM:  Alert & Oriented X3,   CHEST/LUNG: Clear to auscuitation bilaterally;   HEART: S1 S2+;   ABDOMEN: Soft, Nontender, Nondistended; Bowel sounds present  EXTREMITIES: no cyanosis; no edema; no calf tenderness    _________________________________________________  LABS:                        13.6   5.98  )-----------( 63       ( 29 Mar 2019 07:25 )             40.6     03-    141  |  107  |  15  ----------------------------<  106<H>  3.6   |  29  |  1.04    Ca    8.6      29 Mar 2019 07:25  Phos  3.2     03-  Mg     2.1     -    TPro  7.6  /  Alb  3.6  /  TBili  1.4<H>  /  DBili  x   /  AST  12  /  ALT  22  /  AlkPhos  61  03-28    PT/INR - ( 29 Mar 2019 07:25 )   PT: 27.9 sec;   INR: 2.44 ratio         PTT - ( 28 Mar 2019 08:07 )  PTT:38.6 sec  Urinalysis Basic - ( 28 Mar 2019 15:50 )    Color: Yellow / Appearance: Clear / S.010 / pH: x  Gluc: x / Ketone: Negative  / Bili: Negative / Urobili: 4   Blood: x / Protein: 100 / Nitrite: Negative   Leuk Esterase: Negative / RBC: 10-25 /HPF / WBC 0-2 /HPF   Sq Epi: x / Non Sq Epi: Few /HPF / Bacteria: Trace /HPF      CAPILLARY BLOOD GLUCOSE      POCT Blood Glucose.: 188 mg/dL (29 Mar 2019 08:39)  POCT Blood Glucose.: 138 mg/dL (28 Mar 2019 16:30)  POCT Blood Glucose.: 147 mg/dL (28 Mar 2019 11:02) Patient is a 73y old  Male who presents with a chief complaint of Abdominal pain and vomiting (28 Mar 2019 15:42)      INTERVAL HPI/OVERNIGHT EVENTS: NO Bm yesterday, no vomiting but still felling nauseous. Fever of 100.6 yesterday.    MEDICATIONS  (STANDING):  atorvastatin 20 milliGRAM(s) Oral at bedtime  diltiazem    milliGRAM(s) Oral daily  finasteride 5 milliGRAM(s) Oral daily  insulin lispro (HumaLOG) corrective regimen sliding scale   SubCutaneous Before meals and at bedtime  metoprolol tartrate 200 milliGRAM(s) Oral two times a day  tamsulosin 0.4 milliGRAM(s) Oral at bedtime    MEDICATIONS  (PRN):  acetaminophen   Tablet .. 650 milliGRAM(s) Oral every 6 hours PRN Temp greater or equal to 38C (100.4F)      Allergies    No Known Allergies    Intolerances      __________________________________________________  REVIEW OF SYSTEMS:    CONSTITUTIONAL: No fever,   EYES: no acute visual disturbances  NECK: No pain or stiffness  RESPIRATORY: No cough; No shortness of breath  CARDIOVASCULAR: No chest pain, no palpitations  GASTROINTESTINAL: No pain. No nausea or vomiting; No diarrhea       Vital Signs Last 24 Hrs  T(C): 36.9 (29 Mar 2019 04:35), Max: 38.1 (28 Mar 2019 21:32)  T(F): 98.4 (29 Mar 2019 04:35), Max: 100.6 (28 Mar 2019 21:32)  HR: 112 (29 Mar 2019 04:35) (86 - 112)  BP: 119/85 (29 Mar 2019 04:35) (109/55 - 124/68)  BP(mean): --  RR: 16 (29 Mar 2019 04:35) (16 - 18)  SpO2: 97% (29 Mar 2019 04:35) (93% - 97%)    ________________________________________________  PHYSICAL EXAM:  GENERAL: NAD, awake   HEAD:  , Normocephalic  EYES:  conjunctiva and sclera clear  NECK: Supple, No JVD    NERVOUS SYSTEM:  Alert & Oriented X3,   CHEST/LUNG: Clear to auscuitation bilaterally;   HEART: S1 S2+;   ABDOMEN: Soft, Nontender, Nondistended; Bowel sounds present  EXTREMITIES: no cyanosis; no edema; no calf tenderness    _________________________________________________  LABS:                        13.6   5.98  )-----------( 63       ( 29 Mar 2019 07:25 )             40.6     03-    141  |  107  |  15  ----------------------------<  106<H>  3.6   |  29  |  1.04    Ca    8.6      29 Mar 2019 07:25  Phos  3.2     -  Mg     2.1         TPro  7.6  /  Alb  3.6  /  TBili  1.4<H>  /  DBili  x   /  AST  12  /  ALT  22  /  AlkPhos  61  03-28    PT/INR - ( 29 Mar 2019 07:25 )   PT: 27.9 sec;   INR: 2.44 ratio         PTT - ( 28 Mar 2019 08:07 )  PTT:38.6 sec  Urinalysis Basic - ( 28 Mar 2019 15:50 )    Color: Yellow / Appearance: Clear / S.010 / pH: x  Gluc: x / Ketone: Negative  / Bili: Negative / Urobili: 4   Blood: x / Protein: 100 / Nitrite: Negative   Leuk Esterase: Negative / RBC: 10-25 /HPF / WBC 0-2 /HPF   Sq Epi: x / Non Sq Epi: Few /HPF / Bacteria: Trace /HPF      CAPILLARY BLOOD GLUCOSE      POCT Blood Glucose.: 188 mg/dL (29 Mar 2019 08:39)  POCT Blood Glucose.: 138 mg/dL (28 Mar 2019 16:30)  POCT Blood Glucose.: 147 mg/dL (28 Mar 2019 11:02)

## 2019-03-29 NOTE — PROGRESS NOTE ADULT - PROBLEM SELECTOR PLAN 8
Improve VTE score: Goal INR 2-3 with Warfarin , continue to hold  [ ] Previous VTE                                               3  [ ] Thrombophilia                                             2  [ ] Lower limb paralysis                                   2    [ ] Current Cancer                                           2   [x] Immobilization > 24 hrs                              1  [ ] ICU/CCU stay > 24 hours                            1  [x] Age > 60                                                       1

## 2019-03-29 NOTE — PROGRESS NOTE ADULT - SUBJECTIVE AND OBJECTIVE BOX
Subjective:   Just compelteed Magnesium Citrate but still has not had a BM. Passing gas.     Objective:    MEDICATIONS  (STANDING):  atorvastatin 20 milliGRAM(s) Oral at bedtime  diltiazem    milliGRAM(s) Oral daily  docusate sodium 100 milliGRAM(s) Oral two times a day  finasteride 5 milliGRAM(s) Oral daily  insulin lispro (HumaLOG) corrective regimen sliding scale   SubCutaneous Before meals and at bedtime  metoprolol tartrate 200 milliGRAM(s) Oral two times a day  polyethylene glycol 3350 17 Gram(s) Oral once  saline laxative (FLEET) Rectal Enema 1 Enema Rectal once  senna 2 Tablet(s) Oral at bedtime  tamsulosin 0.4 milliGRAM(s) Oral at bedtime    MEDICATIONS  (PRN):  acetaminophen   Tablet .. 650 milliGRAM(s) Oral every 6 hours PRN Temp greater or equal to 38C (100.4F)  ondansetron Injectable 4 milliGRAM(s) IV Push every 6 hours PRN Nausea and/or Vomiting              Vital Signs Last 24 Hrs  T(C): 36.9 (29 Mar 2019 04:35), Max: 38.1 (28 Mar 2019 21:32)  T(F): 98.4 (29 Mar 2019 04:35), Max: 100.6 (28 Mar 2019 21:32)  HR: 89 (29 Mar 2019 11:35) (86 - 112)  BP: 117/71 (29 Mar 2019 11:35) (109/55 - 124/68)  BP(mean): --  RR: 16 (29 Mar 2019 04:35) (16 - 18)  SpO2: 98% (29 Mar 2019 11:35) (93% - 98%)      General:  Well developed, well nourished, alert and active, no pallor, NAD.  HEENT:    Normal appearance of conjunctiva, ears, nose, lips, oropharynx, and oral mucosa, anicteric.  Neck:  No masses, no asymmetry.  Lymph Nodes:  No lymphadenopathy.   Cardiovascular:  RRR normal S1/S2, no murmur.  Respiratory:  CTA B/L, normal respiratory effort.   Abdominal:   soft, no masses or tenderness, normoactive BS, NT/ND, no HSM.  Extremities:   No clubbing or cyanosis, normal capillary refill, no edema.   Skin:   No rash, jaundice, lesions, eczema.   Musculoskeletal:  No joint swelling, erythema or tenderness.   Neuro: No focal deficits.   Other:       LABS:                        13.6   5.98  )-----------( 63       ( 29 Mar 2019 07:25 )             40.6         141  |  107  |  15  ----------------------------<  106<H>  3.6   |  29  |  1.04    Ca    8.6      29 Mar 2019 07:25  Phos  3.2       Mg     2.1         TPro  7.6  /  Alb  3.6  /  TBili  1.4<H>  /  DBili  x   /  AST  12  /  ALT  22  /  AlkPhos  61      PT/INR - ( 29 Mar 2019 07:25 )   PT: 27.9 sec;   INR: 2.44 ratio         PTT - ( 28 Mar 2019 08:07 )  PTT:38.6 sec  Urinalysis Basic - ( 28 Mar 2019 15:50 )    Color: Yellow / Appearance: Clear / S.010 / pH: x  Gluc: x / Ketone: Negative  / Bili: Negative / Urobili: 4   Blood: x / Protein: 100 / Nitrite: Negative   Leuk Esterase: Negative / RBC: 10-25 /HPF / WBC 0-2 /HPF   Sq Epi: x / Non Sq Epi: Few /HPF / Bacteria: Trace /HPF        RADIOLOGY & ADDITIONAL TESTS:

## 2019-03-29 NOTE — PROGRESS NOTE ADULT - PROBLEM SELECTOR PLAN 1
Currently no pain, but still nauseous   Ct abdomen: reviwed   Consulted Dr Nuñez  Likely colonoscopy plan on Monday, s/p Mg citrate yesterday, soft diet, will give  Mg citrate on sat,  then need to start the GoLYTELY from sunday, clear diet  INR goal around 1.5, c/w to hold coumadin, no need for any platelets or FFP at this point

## 2019-03-29 NOTE — CONSULT NOTE ADULT - ASSESSMENT
A/P:    # ? Colon Mass:  -CT A/P reviewed.   -GI on board.   -Plan for Colonoscopy. Needs tissue confirmation.   -CT Chest reviewed. No mets.   -Check CEA  -Consider CT A/P with contrast to further define the 3.5 cm right mid to lower pole parapelvic renal lesion.     # Thrombocytopenia:  No previous counts available here for last 3 years.   ? Chronic / immune / meds.   No spontaneous bleeding / bruising   Recs:  -Check peripheral smear  -Obtain previous records / labs  -Check B12 / folic acid / iron panel  -Hepatitis and HIV panel, if Ok with patient  -Avoid non essential meds  -OK for AC if plts > 50K      Thank you for the consult.  Case discussed with Dr. Yanira Lentz covering for weekend. Please call with questions.

## 2019-03-29 NOTE — PROGRESS NOTE ADULT - PROBLEM SELECTOR PLAN 2
Rate control with Cardizem and Metoprolol   CLARISSA vasc: 3  Hold coumadin   INR 2.44  TSH low to 0.26, f/up free t4 Rate control with Cardizem and Metoprolol   CLARISSA vasc: 3  Hold coumadin   INR 2.44  TSH low to 0.26,  free wnl, likely subclinical hyperthyroidism, CT scan with 1.7 cm left sub sternal thyroid nodule , currently asymptomatic, but patient needs outpatient f.up and repeat TFts

## 2019-03-29 NOTE — PROGRESS NOTE ADULT - PROBLEM SELECTOR PLAN 5
C/w Flomax and Proscar  On CT scan with indeterminate 3.5 cm right middle to lower pole parapelvic renal lesion, left kidney with similar lesion  UA with hematuria   Creatinine slightly elevated  F/up CT chest

## 2019-03-29 NOTE — PROGRESS NOTE ADULT - PROBLEM SELECTOR PLAN 7
CXR with Increased markings within the lung bases is consistent with   previously described bibasilar fibrosis with mild honeycombing   F/up Ct chest

## 2019-03-29 NOTE — PHYSICAL THERAPY INITIAL EVALUATION ADULT - PERTINENT HX OF CURRENT PROBLEM, REHAB EVAL
Pt. admitted to hospital for abdominal pain and vomiting for 3 days. Symptoms are also associated with nausea and subjective fevers at home.

## 2019-03-29 NOTE — PROGRESS NOTE ADULT - PROBLEM SELECTOR PLAN 6
Ct scan with borderline splenomegaly , platelets 71-->63  Continue to monitor  heme/onc consult Dr Miguel

## 2019-03-30 LAB
ANION GAP SERPL CALC-SCNC: 6 MMOL/L — SIGNIFICANT CHANGE UP (ref 5–17)
BUN SERPL-MCNC: 14 MG/DL — SIGNIFICANT CHANGE UP (ref 7–18)
CALCIUM SERPL-MCNC: 8.5 MG/DL — SIGNIFICANT CHANGE UP (ref 8.4–10.5)
CHLORIDE SERPL-SCNC: 105 MMOL/L — SIGNIFICANT CHANGE UP (ref 96–108)
CO2 SERPL-SCNC: 28 MMOL/L — SIGNIFICANT CHANGE UP (ref 22–31)
CREAT SERPL-MCNC: 1.08 MG/DL — SIGNIFICANT CHANGE UP (ref 0.5–1.3)
GLUCOSE SERPL-MCNC: 107 MG/DL — HIGH (ref 70–99)
HAV IGM SER-ACNC: SIGNIFICANT CHANGE UP
HBV CORE IGM SER-ACNC: SIGNIFICANT CHANGE UP
HBV SURFACE AG SER-ACNC: SIGNIFICANT CHANGE UP
HCT VFR BLD CALC: 39.8 % — SIGNIFICANT CHANGE UP (ref 39–50)
HCV AB S/CO SERPL IA: 0.11 S/CO — SIGNIFICANT CHANGE UP (ref 0–0.79)
HCV AB SERPL-IMP: SIGNIFICANT CHANGE UP
HGB BLD-MCNC: 13.5 G/DL — SIGNIFICANT CHANGE UP (ref 13–17)
INR BLD: 1.87 RATIO — HIGH (ref 0.88–1.16)
MCHC RBC-ENTMCNC: 30.1 PG — SIGNIFICANT CHANGE UP (ref 27–34)
MCHC RBC-ENTMCNC: 33.9 GM/DL — SIGNIFICANT CHANGE UP (ref 32–36)
MCV RBC AUTO: 88.6 FL — SIGNIFICANT CHANGE UP (ref 80–100)
NRBC # BLD: 0 /100 WBCS — SIGNIFICANT CHANGE UP (ref 0–0)
PLATELET # BLD AUTO: 62 K/UL — LOW (ref 150–400)
POTASSIUM SERPL-MCNC: 3.6 MMOL/L — SIGNIFICANT CHANGE UP (ref 3.5–5.3)
POTASSIUM SERPL-SCNC: 3.6 MMOL/L — SIGNIFICANT CHANGE UP (ref 3.5–5.3)
PROTHROM AB SERPL-ACNC: 21.2 SEC — HIGH (ref 10–12.9)
RBC # BLD: 4.49 M/UL — SIGNIFICANT CHANGE UP (ref 4.2–5.8)
RBC # FLD: 14.5 % — SIGNIFICANT CHANGE UP (ref 10.3–14.5)
SODIUM SERPL-SCNC: 139 MMOL/L — SIGNIFICANT CHANGE UP (ref 135–145)
WBC # BLD: 5.69 K/UL — SIGNIFICANT CHANGE UP (ref 3.8–10.5)
WBC # FLD AUTO: 5.69 K/UL — SIGNIFICANT CHANGE UP (ref 3.8–10.5)

## 2019-03-30 PROCEDURE — 99233 SBSQ HOSP IP/OBS HIGH 50: CPT | Mod: GC

## 2019-03-30 RX ORDER — SODIUM CHLORIDE 9 MG/ML
1000 INJECTION, SOLUTION INTRAVENOUS
Qty: 0 | Refills: 0 | Status: DISCONTINUED | OUTPATIENT
Start: 2019-03-31 | End: 2019-04-02

## 2019-03-30 RX ORDER — MULTIVIT WITH MIN/MFOLATE/K2 340-15/3 G
1 POWDER (GRAM) ORAL ONCE
Qty: 0 | Refills: 0 | Status: DISCONTINUED | OUTPATIENT
Start: 2019-03-30 | End: 2019-04-01

## 2019-03-30 RX ADMIN — Medication 100 MILLIGRAM(S): at 17:57

## 2019-03-30 RX ADMIN — Medication 200 MILLIGRAM(S): at 06:36

## 2019-03-30 RX ADMIN — ATORVASTATIN CALCIUM 20 MILLIGRAM(S): 80 TABLET, FILM COATED ORAL at 22:17

## 2019-03-30 RX ADMIN — Medication 1 BOTTLE: at 11:31

## 2019-03-30 RX ADMIN — TAMSULOSIN HYDROCHLORIDE 0.4 MILLIGRAM(S): 0.4 CAPSULE ORAL at 22:17

## 2019-03-30 RX ADMIN — Medication 100 MILLIGRAM(S): at 06:36

## 2019-03-30 RX ADMIN — Medication 10 MILLIGRAM(S): at 22:17

## 2019-03-30 RX ADMIN — Medication 200 MILLIGRAM(S): at 17:56

## 2019-03-30 RX ADMIN — Medication 120 MILLIGRAM(S): at 06:36

## 2019-03-30 RX ADMIN — FINASTERIDE 5 MILLIGRAM(S): 5 TABLET, FILM COATED ORAL at 11:31

## 2019-03-30 RX ADMIN — SENNA PLUS 2 TABLET(S): 8.6 TABLET ORAL at 22:17

## 2019-03-30 NOTE — PROGRESS NOTE ADULT - PROBLEM SELECTOR PLAN 5
C/w Flomax and Proscar  On CT scan with indeterminate 3.5 cm right middle to lower pole parapelvic renal lesion, left kidney with similar lesion  UA with hematuria   Creatinine slightly elevated  CT chest wnl

## 2019-03-30 NOTE — PROGRESS NOTE ADULT - PROBLEM SELECTOR PLAN 6
platelets 71-->63--62  Continue to monitor  heme/onc consult appreciated   Vitamin b12 370. folate 11, hep panel neg, will check iron panel, peripeheral smear and HIV

## 2019-03-30 NOTE — PROGRESS NOTE ADULT - PROBLEM SELECTOR PLAN 1
Currently no pain  Colonoscopy plan on Monday, give Mg citrate on today once , then need to start the GoLYTELY from sunday, clear diet, NPO past midnight  INR goal around 1.5, c/w to hold coumadin, no need for any platelets or FFP at this point

## 2019-03-30 NOTE — PROGRESS NOTE ADULT - SUBJECTIVE AND OBJECTIVE BOX
no new c/o  nop bleeding or bruising  PE a, ox3 nad   lungs cta  cor s1s2  abd soft NT  extr c/c/e    Vital Signs Last 24 Hrs  T(C): 37.4 (30 Mar 2019 14:00), Max: 37.6 (30 Mar 2019 05:08)  T(F): 99.3 (30 Mar 2019 14:00), Max: 99.7 (30 Mar 2019 05:08)  HR: 81 (30 Mar 2019 14:00) (81 - 102)  BP: 123/80 (30 Mar 2019 14:00) (105/56 - 135/75)  BP(mean): --  RR: 17 (30 Mar 2019 14:00) (16 - 18)  SpO2: 97% (30 Mar 2019 14:00) (94% - 99%)                        13.5   5.69  )-----------( 62       ( 30 Mar 2019 07:12 )             39.8   03-30    139  |  105  |  14  ----------------------------<  107<H>  3.6   |  28  |  1.08    Ca    8.5      30 Mar 2019 07:12  Phos  3.2     03-29  Mg     2.1     03-29

## 2019-03-30 NOTE — PROGRESS NOTE ADULT - SUBJECTIVE AND OBJECTIVE BOX
Patient is a 73y old  Male who presents with a chief complaint of Abdominal pain and vomiting (29 Mar 2019 16:47)      INTERVAL HPI/OVERNIGHT EVENTS: Had BM yesterday, denies any pain    MEDICATIONS  (STANDING):  atorvastatin 20 milliGRAM(s) Oral at bedtime  diltiazem    milliGRAM(s) Oral daily  docusate sodium 100 milliGRAM(s) Oral two times a day  finasteride 5 milliGRAM(s) Oral daily  insulin lispro (HumaLOG) corrective regimen sliding scale   SubCutaneous Before meals and at bedtime  magnesium citrate Oral Solution 1 Bottle Oral once  metoprolol tartrate 200 milliGRAM(s) Oral two times a day  senna 2 Tablet(s) Oral at bedtime  tamsulosin 0.4 milliGRAM(s) Oral at bedtime    MEDICATIONS  (PRN):  acetaminophen   Tablet .. 650 milliGRAM(s) Oral every 6 hours PRN Temp greater or equal to 38C (100.4F)  ondansetron Injectable 4 milliGRAM(s) IV Push every 6 hours PRN Nausea and/or Vomiting      Allergies    No Known Allergies    Intolerances      __________________________________________________  REVIEW OF SYSTEMS:    CONSTITUTIONAL: No fever,   EYES: no acute visual disturbances  NECK: No pain or stiffness  RESPIRATORY: No cough; No shortness of breath  CARDIOVASCULAR: No chest pain, no palpitations  GASTROINTESTINAL: No pain. No nausea or vomiting; No diarrhea       Vital Signs Last 24 Hrs  T(C): 37.6 (30 Mar 2019 05:08), Max: 37.6 (30 Mar 2019 05:08)  T(F): 99.7 (30 Mar 2019 05:08), Max: 99.7 (30 Mar 2019 05:08)  HR: 102 (30 Mar 2019 05:08) (83 - 102)  BP: 121/64 (30 Mar 2019 05:08) (105/56 - 135/75)  BP(mean): --  RR: 18 (30 Mar 2019 05:08) (16 - 18)  SpO2: 99% (30 Mar 2019 05:08) (94% - 99%)    ________________________________________________  PHYSICAL EXAM:  GENERAL: NAD,   HEAD:  , Normocephalic  EYES:  conjunctiva and sclera clear  NECK: Supple, No JVD    NERVOUS SYSTEM:  Alert & Oriented X3,   CHEST/LUNG: Clear to auscuitation bilaterally;   HEART: S1 S2+;   ABDOMEN: Soft, Nontender, Nondistended; Bowel sounds present  EXTREMITIES: no cyanosis; no edema; no calf tenderness    _________________________________________________  LABS:                        13.5   5.69  )-----------( 62       ( 30 Mar 2019 07:12 )             39.8     03-30    139  |  105  |  14  ----------------------------<  107<H>  3.6   |  28  |  1.08    Ca    8.5      30 Mar 2019 07:12  Phos  3.2     03-29  Mg     2.1     03-29      PT/INR - ( 30 Mar 2019 07:12 )   PT: 21.2 sec;   INR: 1.87 ratio           Urinalysis Basic - ( 28 Mar 2019 15:50 )    Color: Yellow / Appearance: Clear / S.010 / pH: x  Gluc: x / Ketone: Negative  / Bili: Negative / Urobili: 4   Blood: x / Protein: 100 / Nitrite: Negative   Leuk Esterase: Negative / RBC: 10-25 /HPF / WBC 0-2 /HPF   Sq Epi: x / Non Sq Epi: Few /HPF / Bacteria: Trace /HPF      CAPILLARY BLOOD GLUCOSE      POCT Blood Glucose.: 104 mg/dL (30 Mar 2019 08:03)  POCT Blood Glucose.: 89 mg/dL (29 Mar 2019 21:58)  POCT Blood Glucose.: 119 mg/dL (29 Mar 2019 16:46)  POCT Blood Glucose.: 118 mg/dL (29 Mar 2019 11:37)

## 2019-03-30 NOTE — PROGRESS NOTE ADULT - PROBLEM SELECTOR PLAN 2
Rate control with Cardizem and Metoprolol   CLARISSA vasc: 3  Hold coumadin   INR 1.87  TSH low to 0.26,  free wnl, likely subclinical hyperthyroidism, CT scan with 1.7 cm left sub sternal thyroid nodule , currently asymptomatic, but patient needs outpatient f.up and repeat TFts

## 2019-03-31 DIAGNOSIS — R93.3 ABNORMAL FINDINGS ON DIAGNOSTIC IMAGING OF OTHER PARTS OF DIGESTIVE TRACT: ICD-10-CM

## 2019-03-31 LAB
ANION GAP SERPL CALC-SCNC: 5 MMOL/L — SIGNIFICANT CHANGE UP (ref 5–17)
BUN SERPL-MCNC: 12 MG/DL — SIGNIFICANT CHANGE UP (ref 7–18)
CALCIUM SERPL-MCNC: 8.9 MG/DL — SIGNIFICANT CHANGE UP (ref 8.4–10.5)
CHLORIDE SERPL-SCNC: 106 MMOL/L — SIGNIFICANT CHANGE UP (ref 96–108)
CO2 SERPL-SCNC: 30 MMOL/L — SIGNIFICANT CHANGE UP (ref 22–31)
CREAT SERPL-MCNC: 1.15 MG/DL — SIGNIFICANT CHANGE UP (ref 0.5–1.3)
FERRITIN SERPL-MCNC: 120 NG/ML — SIGNIFICANT CHANGE UP (ref 30–400)
GLUCOSE SERPL-MCNC: 115 MG/DL — HIGH (ref 70–99)
HCT VFR BLD CALC: 44.5 % — SIGNIFICANT CHANGE UP (ref 39–50)
HGB BLD-MCNC: 14.7 G/DL — SIGNIFICANT CHANGE UP (ref 13–17)
HIV 1+2 AB+HIV1 P24 AG SERPL QL IA: SIGNIFICANT CHANGE UP
INR BLD: 1.63 RATIO — HIGH (ref 0.88–1.16)
IRON SATN MFR SERPL: 13 % — LOW (ref 20–55)
IRON SATN MFR SERPL: 42 UG/DL — LOW (ref 65–170)
MCHC RBC-ENTMCNC: 29.6 PG — SIGNIFICANT CHANGE UP (ref 27–34)
MCHC RBC-ENTMCNC: 33 GM/DL — SIGNIFICANT CHANGE UP (ref 32–36)
MCV RBC AUTO: 89.5 FL — SIGNIFICANT CHANGE UP (ref 80–100)
NRBC # BLD: 0 /100 WBCS — SIGNIFICANT CHANGE UP (ref 0–0)
PLATELET # BLD AUTO: 77 K/UL — LOW (ref 150–400)
POTASSIUM SERPL-MCNC: 4 MMOL/L — SIGNIFICANT CHANGE UP (ref 3.5–5.3)
POTASSIUM SERPL-SCNC: 4 MMOL/L — SIGNIFICANT CHANGE UP (ref 3.5–5.3)
PROTHROM AB SERPL-ACNC: 18.4 SEC — HIGH (ref 10–12.9)
RBC # BLD: 4.97 M/UL — SIGNIFICANT CHANGE UP (ref 4.2–5.8)
RBC # FLD: 14.5 % — SIGNIFICANT CHANGE UP (ref 10.3–14.5)
SODIUM SERPL-SCNC: 141 MMOL/L — SIGNIFICANT CHANGE UP (ref 135–145)
TIBC SERPL-MCNC: 333 UG/DL — SIGNIFICANT CHANGE UP (ref 250–450)
TRANSFERRIN SERPL-MCNC: 253 MG/DL — SIGNIFICANT CHANGE UP (ref 200–360)
UIBC SERPL-MCNC: 291 UG/DL — SIGNIFICANT CHANGE UP (ref 110–370)
WBC # BLD: 5.71 K/UL — SIGNIFICANT CHANGE UP (ref 3.8–10.5)
WBC # FLD AUTO: 5.71 K/UL — SIGNIFICANT CHANGE UP (ref 3.8–10.5)

## 2019-03-31 PROCEDURE — 99233 SBSQ HOSP IP/OBS HIGH 50: CPT | Mod: GC

## 2019-03-31 RX ORDER — SODIUM CHLORIDE 9 MG/ML
1000 INJECTION, SOLUTION INTRAVENOUS
Qty: 0 | Refills: 0 | Status: DISCONTINUED | OUTPATIENT
Start: 2019-03-31 | End: 2019-04-01

## 2019-03-31 RX ADMIN — Medication 100 MILLIGRAM(S): at 06:06

## 2019-03-31 RX ADMIN — ATORVASTATIN CALCIUM 20 MILLIGRAM(S): 80 TABLET, FILM COATED ORAL at 22:08

## 2019-03-31 RX ADMIN — Medication 20 MILLIGRAM(S): at 22:08

## 2019-03-31 RX ADMIN — TAMSULOSIN HYDROCHLORIDE 0.4 MILLIGRAM(S): 0.4 CAPSULE ORAL at 22:08

## 2019-03-31 RX ADMIN — FINASTERIDE 5 MILLIGRAM(S): 5 TABLET, FILM COATED ORAL at 11:38

## 2019-03-31 RX ADMIN — Medication 4000 MILLILITER(S): at 17:09

## 2019-03-31 RX ADMIN — Medication 200 MILLIGRAM(S): at 17:23

## 2019-03-31 RX ADMIN — Medication 120 MILLIGRAM(S): at 06:06

## 2019-03-31 RX ADMIN — SENNA PLUS 2 TABLET(S): 8.6 TABLET ORAL at 22:07

## 2019-03-31 RX ADMIN — Medication 200 MILLIGRAM(S): at 06:06

## 2019-03-31 RX ADMIN — Medication 100 MILLIGRAM(S): at 17:24

## 2019-03-31 NOTE — PROGRESS NOTE ADULT - PROBLEM SELECTOR PLAN 2
CHADSVASC 3, holding coumadin for possible biopsy. NO need for bridging.  Continue cardizem and metoprolol

## 2019-03-31 NOTE — PROGRESS NOTE ADULT - PROBLEM SELECTOR PLAN 1
Pertinent for possible right ascending colon abdominal mass vs fecal mass.  Plan for colonoscopy tomorrow  CT abdomen with and without IV contrast ordered to continue mets workup.  Oncology input appreciated.

## 2019-03-31 NOTE — PROGRESS NOTE ADULT - SUBJECTIVE AND OBJECTIVE BOX
Patient is a 73y old  Male who presents with a chief complaint of Abdominal pain and vomiting (30 Mar 2019 16:34)    INTERVAL HPI/OVERNIGHT EVENTS:  Patient seen and examined at bedside, feels ok  Tomorrow plan for colonoscopy, being prepped today.  States going to the bathroom 3-4 times since yesterday.    Allergies    No Known Allergies    Intolerances    REVIEW OF SYSTEMS:  CONSTITUTIONAL: No fever, weight loss, or fatigue  EYES: No eye pain, visual disturbances, or discharge  RESPIRATORY: No cough, wheezing or shortness of breath  CARDIOVASCULAR: No chest pain, feeling of heart beats  GASTROINTESTINAL: No abdominal or epigastric pain. No nausea, vomiting; No diarrhea or constipation.  GENITOURINARY: No dysuria, frequency, hematuria  NEUROLOGICAL: No headaches  MUSCULOSKELETAL: No joint pain  PSYCHIATRIC: No depression or anxiety  HEME/LYMPH: No easy bruising, or bleeding gums  ALLERGY AND IMMUNOLOGIC: No hives or eczema    Medications:  acetaminophen   Tablet .. 650 milliGRAM(s) Oral every 6 hours PRN Temp greater or equal to 38C (100.4F)  atorvastatin 20 milliGRAM(s) Oral at bedtime  bisacodyl 20 milliGRAM(s) Oral at bedtime  dextrose 5% + sodium chloride 0.9%. 1000 milliLiter(s) IV Continuous <Continuous>  diltiazem    milliGRAM(s) Oral daily  docusate sodium 100 milliGRAM(s) Oral two times a day  finasteride 5 milliGRAM(s) Oral daily  insulin lispro (HumaLOG) corrective regimen sliding scale   SubCutaneous Before meals and at bedtime  magnesium citrate Oral Solution 1 Bottle Oral once  metoprolol tartrate 200 milliGRAM(s) Oral two times a day  ondansetron Injectable 4 milliGRAM(s) IV Push every 6 hours PRN Nausea and/or Vomiting  polyethylene glycol/electrolyte Solution. 4000 milliLiter(s) Oral once  senna 2 Tablet(s) Oral at bedtime  tamsulosin 0.4 milliGRAM(s) Oral at bedtime      PHYSICAL EXAM:  Vital Signs Last 24 Hrs  T(C): 36.5 (31 Mar 2019 05:02), Max: 37.1 (30 Mar 2019 21:23)  T(F): 97.7 (31 Mar 2019 05:02), Max: 98.8 (30 Mar 2019 21:23)  HR: 100 (31 Mar 2019 05:02) (81 - 113)  BP: 126/74 (31 Mar 2019 05:02) (125/63 - 128/79)  BP(mean): --  RR: 16 (31 Mar 2019 05:02) (16 - 17)  SpO2: 95% (31 Mar 2019 05:02) (95% - 96%)    GENERAL: NAD  HEAD:  Atraumatic, Normocephalic  EYES: EOMI, PERRLA, conjunctiva and sclera clear  ENT: moist mucous membranes  NECK: Supple, No JVD, Normal thyroid  NERVOUS SYSTEM:  Alert & Oriented X3, Good concentration;   CHEST/LUNG: No rales, rhonchi, wheezing, or rubs  HEART: Regular rate and rhythm; No murmurs, rubs, or gallops  ABDOMEN: Soft, Nontender, Nondistended; Bowel sounds present  EXTREMITIES:  2+ Peripheral Pulses, No clubbing, cyanosis, or edema  SKIN: No rashes or lesions    LABS:                        14.7   5.71  )-----------( 77       ( 31 Mar 2019 07:31 )             44.5     03-31    141  |  106  |  12  ----------------------------<  115<H>  4.0   |  30  |  1.15    Ca    8.9      31 Mar 2019 07:31          PT/INR - ( 31 Mar 2019 07:31 )   PT: 18.4 sec;   INR: 1.63 ratio                 Culture & Sensitivity:   CAPILLARY BLOOD GLUCOSE      POCT Blood Glucose.: 132 mg/dL (31 Mar 2019 11:51)  POCT Blood Glucose.: 135 mg/dL (31 Mar 2019 07:49)  POCT Blood Glucose.: 98 mg/dL (30 Mar 2019 21:28)  POCT Blood Glucose.: 86 mg/dL (30 Mar 2019 16:56)        RADIOLOGY & ADDITIONAL TESTS:  Radiology testing independently reviewed:     Consultant(s) Notes Reviewed:  [ x] YES  [ ] NO    Care Discussed with Consultants/Other Providers [ x] YES  [ ] NO

## 2019-04-01 LAB
ANION GAP SERPL CALC-SCNC: 8 MMOL/L — SIGNIFICANT CHANGE UP (ref 5–17)
BUN SERPL-MCNC: 10 MG/DL — SIGNIFICANT CHANGE UP (ref 7–18)
CALCIUM SERPL-MCNC: 8.6 MG/DL — SIGNIFICANT CHANGE UP (ref 8.4–10.5)
CHLORIDE SERPL-SCNC: 106 MMOL/L — SIGNIFICANT CHANGE UP (ref 96–108)
CO2 SERPL-SCNC: 26 MMOL/L — SIGNIFICANT CHANGE UP (ref 22–31)
CREAT SERPL-MCNC: 1.14 MG/DL — SIGNIFICANT CHANGE UP (ref 0.5–1.3)
GLUCOSE SERPL-MCNC: 129 MG/DL — HIGH (ref 70–99)
HCT VFR BLD CALC: 43.8 % — SIGNIFICANT CHANGE UP (ref 39–50)
HGB BLD-MCNC: 15 G/DL — SIGNIFICANT CHANGE UP (ref 13–17)
INR BLD: 1.89 RATIO — HIGH (ref 0.88–1.16)
MCHC RBC-ENTMCNC: 29.9 PG — SIGNIFICANT CHANGE UP (ref 27–34)
MCHC RBC-ENTMCNC: 34.2 GM/DL — SIGNIFICANT CHANGE UP (ref 32–36)
MCV RBC AUTO: 87.3 FL — SIGNIFICANT CHANGE UP (ref 80–100)
NRBC # BLD: 0 /100 WBCS — SIGNIFICANT CHANGE UP (ref 0–0)
PLATELET # BLD AUTO: 89 K/UL — LOW (ref 150–400)
POTASSIUM SERPL-MCNC: 3.6 MMOL/L — SIGNIFICANT CHANGE UP (ref 3.5–5.3)
POTASSIUM SERPL-SCNC: 3.6 MMOL/L — SIGNIFICANT CHANGE UP (ref 3.5–5.3)
PROTHROM AB SERPL-ACNC: 21.4 SEC — HIGH (ref 10–12.9)
RBC # BLD: 5.02 M/UL — SIGNIFICANT CHANGE UP (ref 4.2–5.8)
RBC # FLD: 14.6 % — HIGH (ref 10.3–14.5)
SODIUM SERPL-SCNC: 140 MMOL/L — SIGNIFICANT CHANGE UP (ref 135–145)
WBC # BLD: 6.34 K/UL — SIGNIFICANT CHANGE UP (ref 3.8–10.5)
WBC # FLD AUTO: 6.34 K/UL — SIGNIFICANT CHANGE UP (ref 3.8–10.5)

## 2019-04-01 PROCEDURE — 99233 SBSQ HOSP IP/OBS HIGH 50: CPT | Mod: GC

## 2019-04-01 PROCEDURE — 88305 TISSUE EXAM BY PATHOLOGIST: CPT | Mod: 26

## 2019-04-01 PROCEDURE — 45380 COLONOSCOPY AND BIOPSY: CPT

## 2019-04-01 PROCEDURE — 74178 CT ABD&PLV WO CNTR FLWD CNTR: CPT | Mod: 26

## 2019-04-01 RX ADMIN — ATORVASTATIN CALCIUM 20 MILLIGRAM(S): 80 TABLET, FILM COATED ORAL at 22:05

## 2019-04-01 RX ADMIN — TAMSULOSIN HYDROCHLORIDE 0.4 MILLIGRAM(S): 0.4 CAPSULE ORAL at 22:05

## 2019-04-01 NOTE — DIETITIAN INITIAL EVALUATION ADULT. - MD RECOMMEND
po supplement/Advance diet with nutrition supplement as medically feasible, or may consider alternate nutrition support if NPO prolonged.

## 2019-04-01 NOTE — DIETITIAN INITIAL EVALUATION ADULT. - PERTINENT LABORATORY DATA
04-01 Na140 mmol/L Glu 129 mg/dL<H> K+ 3.6 mmol/L Cr  1.14 mg/dL BUN 10 mg/dL 03-29 Phos 3.2 mg/dL 03-28 Alb 3.6 g/dL 03-28 [id number] 6.3 %<H> 03-28 Chol 80 mg/dL LDL 35 mg/dL HDL 35 mg/dL<L> Trig 52 mg/dL

## 2019-04-01 NOTE — PROGRESS NOTE ADULT - SUBJECTIVE AND OBJECTIVE BOX
Patient is a 73y old  Male who presents with a chief complaint of Abdominal pain and vomiting (01 Apr 2019 10:35)      INTERVAL HPI/OVERNIGHT EVENTS: no new complaints, for colonoscopy today     MEDICATIONS  (STANDING):  atorvastatin 20 milliGRAM(s) Oral at bedtime  dextrose 5% + sodium chloride 0.9%. 1000 milliLiter(s) (75 mL/Hr) IV Continuous <Continuous>  diltiazem    milliGRAM(s) Oral daily  docusate sodium 100 milliGRAM(s) Oral two times a day  finasteride 5 milliGRAM(s) Oral daily  insulin lispro (HumaLOG) corrective regimen sliding scale   SubCutaneous Before meals and at bedtime  magnesium citrate Oral Solution 1 Bottle Oral once  metoprolol tartrate 200 milliGRAM(s) Oral two times a day  senna 2 Tablet(s) Oral at bedtime  tamsulosin 0.4 milliGRAM(s) Oral at bedtime    MEDICATIONS  (PRN):  acetaminophen   Tablet .. 650 milliGRAM(s) Oral every 6 hours PRN Temp greater or equal to 38C (100.4F)  ondansetron Injectable 4 milliGRAM(s) IV Push every 6 hours PRN Nausea and/or Vomiting      Allergies    No Known Allergies    Intolerances      __________________________________________________  REVIEW OF SYSTEMS:    CONSTITUTIONAL: No fever,   EYES: no acute visual disturbances  NECK: No pain or stiffness  RESPIRATORY: No cough; No shortness of breath  CARDIOVASCULAR: No chest pain, no palpitations  GASTROINTESTINAL: No pain. No nausea or vomiting; No diarrhea       Vital Signs Last 24 Hrs  T(C): 36.5 (31 Mar 2019 20:21), Max: 36.5 (31 Mar 2019 20:21)  T(F): 97.7 (31 Mar 2019 20:21), Max: 97.7 (31 Mar 2019 20:21)  HR: 84 (01 Apr 2019 11:19) (69 - 84)  BP: 128/78 (01 Apr 2019 11:19) (101/85 - 128/78)  BP(mean): --  RR: 16 (01 Apr 2019 11:19) (16 - 16)  SpO2: 98% (01 Apr 2019 11:19) (98% - 98%)    ________________________________________________  PHYSICAL EXAM:  GENERAL: NAD, awake   HEAD:  , Normocephalic  EYES:  conjunctiva and sclera clear  NECK: Supple, No JVD    NERVOUS SYSTEM:  Alert & Oriented X3,   CHEST/LUNG: Clear to auscuitation bilaterally;   HEART: S1 S2+;   ABDOMEN: Soft, Nontender, Nondistended; Bowel sounds present  EXTREMITIES: no cyanosis; no edema; no calf tenderness    _________________________________________________  LABS:                        15.0   6.34  )-----------( 89       ( 01 Apr 2019 06:58 )             43.8     04-01    140  |  106  |  10  ----------------------------<  129<H>  3.6   |  26  |  1.14    Ca    8.6      01 Apr 2019 06:58      PT/INR - ( 01 Apr 2019 06:58 )   PT: 21.4 sec;   INR: 1.89 ratio             CAPILLARY BLOOD GLUCOSE      POCT Blood Glucose.: 113 mg/dL (01 Apr 2019 07:46)  POCT Blood Glucose.: 89 mg/dL (31 Mar 2019 21:32)  POCT Blood Glucose.: 115 mg/dL (31 Mar 2019 16:55)  POCT Blood Glucose.: 132 mg/dL (31 Mar 2019 11:51)

## 2019-04-01 NOTE — PROGRESS NOTE ADULT - PROBLEM SELECTOR PLAN 6
platelets 71-->63--62--89  Continue to monitor  Likely chronic  W/up for thrombocytopenia in progress, for low Vitamin b12 f.up MMa

## 2019-04-01 NOTE — DIETITIAN INITIAL EVALUATION ADULT. - EST PROTEIN NEEDS4
Ventricular Rate : 83   Atrial Rate : 83   P-R Interval : 138   QRS Duration : 86   Q-T Interval : 390   QTC Calculation(Bezet) : 458   P Axis : 50   R Axis : 28   T Axis : 53   Diagnosis : Normal sinus rhythm~Normal ECG~When compared with ECG of 06-SEP-2018 07:43,~No significant change was found~Confirmed by Yaw Ryder ([id number]) on 10/28/2018 8:35:24 PM      67.1

## 2019-04-01 NOTE — PROGRESS NOTE ADULT - PROBLEM SELECTOR PLAN 1
Colonoscopy today  INR is high so will give 1 FFP Colonoscopy today  INR is high so will give 1 FFP, based on colonoscopy results need to restart coumadin, (home dose is 6 mg )

## 2019-04-01 NOTE — DIETITIAN INITIAL EVALUATION ADULT. - OTHER INFO
Patient seen for LOS. Patient from home lives with wife. Visited pt. alert & wife at bedside, per wife pt. not eating well x3-4days abdominal pain & vomiting PTA, stated pt. wt. stable at 189 Lbs? & current wt. 199 Lbs noted, presently pt. NPO & awaiting colonoscopy today & followed by GI/team. Skin intact, d/w RN.

## 2019-04-01 NOTE — PROGRESS NOTE ADULT - SUBJECTIVE AND OBJECTIVE BOX
S: Patient seen this morning. No new complaints. Plan for Colonoscopy today.      T(C): 36.5 (31 Mar 2019 20:21), Max: 36.5 (31 Mar 2019 20:21)  T(F): 97.7 (31 Mar 2019 20:21), Max: 97.7 (31 Mar 2019 20:21)  HR: 69 (31 Mar 2019 20:21) (69 - 80)  BP: 101/85 (31 Mar 2019 20:21) (101/85 - 124/63)  BP(mean): --  ABP: --  ABP(mean): --  RR: 16 (31 Mar 2019 20:21) (16 - 16)  SpO2: 98% (31 Mar 2019 20:21) (98% - 98%)    Gen: A&O x 3  CVS: s1s2  Resp: ctabk  GI: no hsm. soft  CNS: no focal deficits.    CBC Full  -  ( 01 Apr 2019 06:58 )  WBC Count : 6.34 K/uL  RBC Count : 5.02 M/uL  Hemoglobin : 15.0 g/dL  Hematocrit : 43.8 %  Platelet Count - Automated : 89 K/uL  Mean Cell Volume : 87.3 fl  Mean Cell Hemoglobin : 29.9 pg  Mean Cell Hemoglobin Concentration : 34.2 gm/dL  Auto Neutrophil # : x  Auto Lymphocyte # : x  Auto Monocyte # : x  Auto Eosinophil # : x  Auto Basophil # : x  Auto Neutrophil % : x  Auto Lymphocyte % : x  Auto Monocyte % : x  Auto Eosinophil % : x  Auto Basophil % : x    < from: CT Chest w/ IV Cont (03.29.19 @ 11:04) >  IMPRESSION:    Chronic bibasilar fibrosis with honeycombing. Bilateral lower lobe   bronchiectasis.    Small right pleural effusion.    1.7 cm left substernal thyroid nodule.      < from: CT Abdomen and Pelvis w/ IV Cont (03.27.19 @ 18:11) >  IMPRESSION:    Evaluation of the GI tract is limited without enteric contrast.    Mass versus fecal matter in the mid ascending colon as above. Recommend   colonoscopy.    Normal appendix.    Indeterminant 3.5 cm right mid to lower pole parapelvic renal lesion.   Unclear if the mass is cystic or solid. Recommend pre and postcontrast CT.    Small fat-containing umbilical and bilateral inguinal hernias.    Hepatic steatosis. Borderline splenomegaly.    Fibrotic lung disease.    < end of copied text >

## 2019-04-01 NOTE — PROGRESS NOTE ADULT - PROBLEM SELECTOR PLAN 2
Rate control with Cardizem and Metoprolol   CLARISSA vasc: 3  Hold coumadin , restart after colonoscopy   TSH low, repeat TFts in 4 weeks

## 2019-04-01 NOTE — CHART NOTE - NSCHARTNOTEFT_GEN_A_CORE
Colonoscopy Report  Indication: Abnormal Imaging   Referring: Dr. Willoughby   Instrument:  #[id number]  Anesthesia: MAC  Consent:  Informed consent was obtained from the patient after providing any opportunity for questions  Procedure: After placing the patient in the left lateral decubitus position, the colonoscope was gently inserted into the rectum and under direct visualization advanced to the cecum..    Color, texture, mucosa, and anatomy of the colon were carefully examined with the scope. The patient tolerated the procedure well. After completion of the exam, the patient was transferred to the recovery room.     Preparation: Miralax and bisacodyl.  Prep was excellent. BBPS= R=3 T=3 L=3   Findings:   Anal Canal	Normal    Rectum	Normal. Retro-flexion performed.   Sigmoid Colon 	4 mm polyp completely removed with the cold biopsy forceps [3]. One hemo-clip placed post polypectomy   Descending Colon	4 mm polyp completely removed with the cold biopsy forceps [2]   Splenic Flexure	Normal  Transverse Colon	Normal  Hepatic Flexure	Normal  Ascending Colon	Normal    Cecum	Directly across the IC valve a lateral spreading ulcerated mass. This may represent an area of ischemia (ischemic ulcer) or colon malignancy. Biopsies taken [1]  Ileo-cecal Valve	Normal   Ileum 	Not seen    Date and time: 4/1/2019 4:44:21 PM  EBL:0    Impression:  1- Possible malignancy at the cecum (versus ischemic ulcer) 2- Colon polyp     Plan: 1- Resume diet 2- Check CEA level 3- Follow up biopsy 4- Restart AC tomorrow     Procedure Start Time: 16:27   Cecum Reached Time:  16:30  Procedure End Time:    16:39   Total Withdrawal Time:   9 minutes         Attending:         Newton Nuñez M.D.   Date and Time: 4/1/2019 4:44:21 PM

## 2019-04-02 VITALS
DIASTOLIC BLOOD PRESSURE: 69 MMHG | OXYGEN SATURATION: 100 % | TEMPERATURE: 99 F | RESPIRATION RATE: 17 BRPM | SYSTOLIC BLOOD PRESSURE: 116 MMHG | HEART RATE: 77 BPM

## 2019-04-02 LAB
ANION GAP SERPL CALC-SCNC: 7 MMOL/L — SIGNIFICANT CHANGE UP (ref 5–17)
BUN SERPL-MCNC: 9 MG/DL — SIGNIFICANT CHANGE UP (ref 7–18)
CALCIUM SERPL-MCNC: 8.9 MG/DL — SIGNIFICANT CHANGE UP (ref 8.4–10.5)
CHLORIDE SERPL-SCNC: 108 MMOL/L — SIGNIFICANT CHANGE UP (ref 96–108)
CO2 SERPL-SCNC: 27 MMOL/L — SIGNIFICANT CHANGE UP (ref 22–31)
CREAT SERPL-MCNC: 1.14 MG/DL — SIGNIFICANT CHANGE UP (ref 0.5–1.3)
GLUCOSE SERPL-MCNC: 99 MG/DL — SIGNIFICANT CHANGE UP (ref 70–99)
HCT VFR BLD CALC: 42.2 % — SIGNIFICANT CHANGE UP (ref 39–50)
HGB BLD-MCNC: 14.2 G/DL — SIGNIFICANT CHANGE UP (ref 13–17)
INR BLD: 2.18 RATIO — HIGH (ref 0.88–1.16)
MCHC RBC-ENTMCNC: 29.6 PG — SIGNIFICANT CHANGE UP (ref 27–34)
MCHC RBC-ENTMCNC: 33.6 GM/DL — SIGNIFICANT CHANGE UP (ref 32–36)
MCV RBC AUTO: 87.9 FL — SIGNIFICANT CHANGE UP (ref 80–100)
NRBC # BLD: 0 /100 WBCS — SIGNIFICANT CHANGE UP (ref 0–0)
PLATELET # BLD AUTO: 76 K/UL — LOW (ref 150–400)
POTASSIUM SERPL-MCNC: 3.6 MMOL/L — SIGNIFICANT CHANGE UP (ref 3.5–5.3)
POTASSIUM SERPL-SCNC: 3.6 MMOL/L — SIGNIFICANT CHANGE UP (ref 3.5–5.3)
PROTHROM AB SERPL-ACNC: 24.8 SEC — HIGH (ref 10–12.9)
RBC # BLD: 4.8 M/UL — SIGNIFICANT CHANGE UP (ref 4.2–5.8)
RBC # FLD: 14.2 % — SIGNIFICANT CHANGE UP (ref 10.3–14.5)
SODIUM SERPL-SCNC: 142 MMOL/L — SIGNIFICANT CHANGE UP (ref 135–145)
WBC # BLD: 4.4 K/UL — SIGNIFICANT CHANGE UP (ref 3.8–10.5)
WBC # FLD AUTO: 4.4 K/UL — SIGNIFICANT CHANGE UP (ref 3.8–10.5)

## 2019-04-02 PROCEDURE — 80053 COMPREHEN METABOLIC PANEL: CPT

## 2019-04-02 PROCEDURE — 82728 ASSAY OF FERRITIN: CPT

## 2019-04-02 PROCEDURE — 36430 TRANSFUSION BLD/BLD COMPNT: CPT

## 2019-04-02 PROCEDURE — 88305 TISSUE EXAM BY PATHOLOGIST: CPT

## 2019-04-02 PROCEDURE — 84100 ASSAY OF PHOSPHORUS: CPT

## 2019-04-02 PROCEDURE — 84436 ASSAY OF TOTAL THYROXINE: CPT

## 2019-04-02 PROCEDURE — 71045 X-RAY EXAM CHEST 1 VIEW: CPT

## 2019-04-02 PROCEDURE — 74178 CT ABD&PLV WO CNTR FLWD CNTR: CPT

## 2019-04-02 PROCEDURE — 74177 CT ABD & PELVIS W/CONTRAST: CPT

## 2019-04-02 PROCEDURE — 86900 BLOOD TYPING SEROLOGIC ABO: CPT

## 2019-04-02 PROCEDURE — C1889: CPT

## 2019-04-02 PROCEDURE — 81001 URINALYSIS AUTO W/SCOPE: CPT

## 2019-04-02 PROCEDURE — 82378 CARCINOEMBRYONIC ANTIGEN: CPT

## 2019-04-02 PROCEDURE — 83690 ASSAY OF LIPASE: CPT

## 2019-04-02 PROCEDURE — 80048 BASIC METABOLIC PNL TOTAL CA: CPT

## 2019-04-02 PROCEDURE — 84484 ASSAY OF TROPONIN QUANT: CPT

## 2019-04-02 PROCEDURE — 84439 ASSAY OF FREE THYROXINE: CPT

## 2019-04-02 PROCEDURE — 36415 COLL VENOUS BLD VENIPUNCTURE: CPT

## 2019-04-02 PROCEDURE — 84466 ASSAY OF TRANSFERRIN: CPT

## 2019-04-02 PROCEDURE — 83835 ASSAY OF METANEPHRINES: CPT

## 2019-04-02 PROCEDURE — 71260 CT THORAX DX C+: CPT

## 2019-04-02 PROCEDURE — 96374 THER/PROPH/DIAG INJ IV PUSH: CPT

## 2019-04-02 PROCEDURE — 93005 ELECTROCARDIOGRAM TRACING: CPT

## 2019-04-02 PROCEDURE — 85610 PROTHROMBIN TIME: CPT

## 2019-04-02 PROCEDURE — 80061 LIPID PANEL: CPT

## 2019-04-02 PROCEDURE — 84443 ASSAY THYROID STIM HORMONE: CPT

## 2019-04-02 PROCEDURE — 83036 HEMOGLOBIN GLYCOSYLATED A1C: CPT

## 2019-04-02 PROCEDURE — 96375 TX/PRO/DX INJ NEW DRUG ADDON: CPT

## 2019-04-02 PROCEDURE — 83921 ORGANIC ACID SINGLE QUANT: CPT

## 2019-04-02 PROCEDURE — 87389 HIV-1 AG W/HIV-1&-2 AB AG IA: CPT

## 2019-04-02 PROCEDURE — 99239 HOSP IP/OBS DSCHRG MGMT >30: CPT | Mod: GC

## 2019-04-02 PROCEDURE — 82746 ASSAY OF FOLIC ACID SERUM: CPT

## 2019-04-02 PROCEDURE — 85730 THROMBOPLASTIN TIME PARTIAL: CPT

## 2019-04-02 PROCEDURE — 82962 GLUCOSE BLOOD TEST: CPT

## 2019-04-02 PROCEDURE — 86901 BLOOD TYPING SEROLOGIC RH(D): CPT

## 2019-04-02 PROCEDURE — 80162 ASSAY OF DIGOXIN TOTAL: CPT

## 2019-04-02 PROCEDURE — 97161 PT EVAL LOW COMPLEX 20 MIN: CPT

## 2019-04-02 PROCEDURE — 99285 EMERGENCY DEPT VISIT HI MDM: CPT | Mod: 25

## 2019-04-02 PROCEDURE — 82607 VITAMIN B-12: CPT

## 2019-04-02 PROCEDURE — 82088 ASSAY OF ALDOSTERONE: CPT

## 2019-04-02 PROCEDURE — 83735 ASSAY OF MAGNESIUM: CPT

## 2019-04-02 PROCEDURE — 83550 IRON BINDING TEST: CPT

## 2019-04-02 PROCEDURE — 80074 ACUTE HEPATITIS PANEL: CPT

## 2019-04-02 PROCEDURE — 86850 RBC ANTIBODY SCREEN: CPT

## 2019-04-02 PROCEDURE — 85027 COMPLETE CBC AUTOMATED: CPT

## 2019-04-02 PROCEDURE — 83540 ASSAY OF IRON: CPT

## 2019-04-02 PROCEDURE — P9059: CPT

## 2019-04-02 RX ORDER — WARFARIN SODIUM 2.5 MG/1
6 TABLET ORAL ONCE
Qty: 0 | Refills: 0 | Status: DISCONTINUED | OUTPATIENT
Start: 2019-04-02 | End: 2019-04-02

## 2019-04-02 RX ORDER — SENNA PLUS 8.6 MG/1
2 TABLET ORAL
Qty: 10 | Refills: 0 | OUTPATIENT
Start: 2019-04-02 | End: 2019-04-06

## 2019-04-02 RX ORDER — METOPROLOL TARTRATE 50 MG
200 TABLET ORAL
Qty: 0 | Refills: 0 | Status: DISCONTINUED | OUTPATIENT
Start: 2019-04-02 | End: 2019-04-02

## 2019-04-02 RX ORDER — DOCUSATE SODIUM 100 MG
1 CAPSULE ORAL
Qty: 10 | Refills: 0 | OUTPATIENT
Start: 2019-04-02 | End: 2019-04-06

## 2019-04-02 RX ADMIN — FINASTERIDE 5 MILLIGRAM(S): 5 TABLET, FILM COATED ORAL at 12:04

## 2019-04-02 RX ADMIN — Medication 120 MILLIGRAM(S): at 06:31

## 2019-04-02 RX ADMIN — Medication 200 MILLIGRAM(S): at 06:31

## 2019-04-02 NOTE — PROGRESS NOTE ADULT - PROBLEM SELECTOR PLAN 1
Colonoscopy with possible cecal malignancy, vs ischemic ulcer  Coumadin to be restarted today Colonoscopy with possible cecal malignancy, vs ischemic ulcer, biopsy f/up  Coumadin to be restarted today

## 2019-04-02 NOTE — PROGRESS NOTE ADULT - PROBLEM SELECTOR PLAN 5
C/w Flomax and Proscar  Ct scan with adrenal incidentoloma, will send w.p, outpatient f.up C/w Flomax and Proscar  Ct scan with adrenal incidentaloma, will send w.p, outpatient f.up

## 2019-04-02 NOTE — PROGRESS NOTE ADULT - SUBJECTIVE AND OBJECTIVE BOX
Patient is a 73y old  Male who presents with a chief complaint of Abdominal pain and vomiting (01 Apr 2019 11:35)      INTERVAL HPI/OVERNIGHT EVENTS: no new complaints    MEDICATIONS  (STANDING):  atorvastatin 20 milliGRAM(s) Oral at bedtime  diltiazem    milliGRAM(s) Oral daily  docusate sodium 100 milliGRAM(s) Oral two times a day  finasteride 5 milliGRAM(s) Oral daily  insulin lispro (HumaLOG) corrective regimen sliding scale   SubCutaneous Before meals and at bedtime  metoprolol tartrate 200 milliGRAM(s) Oral two times a day  senna 2 Tablet(s) Oral at bedtime  tamsulosin 0.4 milliGRAM(s) Oral at bedtime  warfarin 6 milliGRAM(s) Oral once    MEDICATIONS  (PRN):  acetaminophen   Tablet .. 650 milliGRAM(s) Oral every 6 hours PRN Temp greater or equal to 38C (100.4F)  ondansetron Injectable 4 milliGRAM(s) IV Push every 6 hours PRN Nausea and/or Vomiting      Allergies    No Known Allergies    Intolerances      __________________________________________________  REVIEW OF SYSTEMS:    CONSTITUTIONAL: No fever,   EYES: no acute visual disturbances  NECK: No pain or stiffness  RESPIRATORY: No cough; No shortness of breath  CARDIOVASCULAR: No chest pain, no palpitations  GASTROINTESTINAL: No pain. No nausea or vomiting; No diarrhea       Vital Signs Last 24 Hrs  T(C): 37.4 (02 Apr 2019 05:11), Max: 37.4 (02 Apr 2019 05:11)  T(F): 99.4 (02 Apr 2019 05:11), Max: 99.4 (02 Apr 2019 05:11)  HR: 112 (02 Apr 2019 05:11) (84 - 113)  BP: 132/76 (02 Apr 2019 05:11) (116/71 - 135/67)  BP(mean): --  RR: 17 (02 Apr 2019 05:11) (16 - 18)  SpO2: 96% (02 Apr 2019 05:11) (96% - 99%)    ________________________________________________  PHYSICAL EXAM:  GENERAL: NAD,   HEAD:  , Normocephalic  EYES:  conjunctiva and sclera clear  NECK: Supple, No JVD    NERVOUS SYSTEM:  Alert & Oriented X3,   CHEST/LUNG: Clear to auscuitation bilaterally;   HEART: S1 S2+;   ABDOMEN: Soft, Nontender, Nondistended; Bowel sounds present  EXTREMITIES: no cyanosis; no edema; no calf tenderness    _________________________________________________  LABS:                        14.2   4.40  )-----------( 76       ( 02 Apr 2019 07:34 )             42.2     04-02    142  |  108  |  9   ----------------------------<  99  3.6   |  27  |  1.14    Ca    8.9      02 Apr 2019 07:34      PT/INR - ( 02 Apr 2019 07:34 )   PT: 24.8 sec;   INR: 2.18 ratio             CAPILLARY BLOOD GLUCOSE      POCT Blood Glucose.: 98 mg/dL (02 Apr 2019 08:03)  POCT Blood Glucose.: 116 mg/dL (01 Apr 2019 21:00)  POCT Blood Glucose.: 98 mg/dL (01 Apr 2019 17:08)  POCT Blood Glucose.: 109 mg/dL (01 Apr 2019 11:35)

## 2019-04-02 NOTE — PROGRESS NOTE ADULT - PROBLEM SELECTOR PROBLEM 1
Colon disorder
Abnormal computed tomography of gastrointestinal tract
Colon disorder

## 2019-04-02 NOTE — PROGRESS NOTE ADULT - ASSESSMENT
73 year old male with ? non obstructing mass versus fecal impaction :    Plan:  Hold coumadin   Magnesium citrate tomorrow evenening   4liters GL on Sunday   NPO post midnight Sunday night   Colonoscopy Monday
# ? Colon Mass:  found on CT a/p  GI on the case for colonoscopy on Monday  met w/u with CT chest negative  awaiting tissue diagnosis  further recs depend on findings   CEA 3.5 WNL  --Consider CT A/P with contrast to further define the 3.5 cm right mid to lower pole parapelvic renal lesion.     # Thrombocytopenia:  No previous counts available here for last 3 years.   as per pt chronic for a few years  asymptomatic  No spontaneous bleeding / bruising   w/u with folic acid normal, iron panel wnl, hep screen neg for viral hepatitis, HIV negative, Vit B 12 borderline   -check MMA to eval for occult vit b 12 deficiency  -Avoid non essential meds  -OK for AC if plts > 50K    Case d/w Dr. Doyle
73 Male with PMH of Afib (Warfarin 6mg), HTN, DM, BPH came to hospital for abdominal pain and vomiting for 3 days. Admitted to medicine for Ascending colon mass vs fecal matter.
A/P:    # ? Colon Mass:  found on CT a/p  GI on the case for colonoscopy on Monday  met w/u with CT chest negative  awaiting tissue diagnosis  further recs depend on findings   --Consider CT A/P with contrast to further define the 3.5 cm right mid to lower pole parapelvic renal lesion.     # Thrombocytopenia:  No previous counts available here for last 3 years.   as per pt  chronic fo a few years  asymptomatic  No spontaneous bleeding / bruising   w/u with folic acid normal , hep screen neg for viral hepatitis, Vit B 12 borderline we will order MMA to eval for occult vit b 12 deficiency    -Avoid non essential meds  -OK for AC if plts > 50K
73 Male with PMH of Afib (Warfarin 6mg), HTN, DM, BPH came to hospital for abdominal pain and vomiting for 3 days. Admitted to medicine for Ascending colon mass vs fecal matter.
73 Male with PMH of Afib (Warfarin 6mg), HTN, DM, BPH came to hospital for abdominal pain and vomiting for 3 days. Admitted to medicine for Ascending colon mass vs fecal matter.

## 2019-04-02 NOTE — PROGRESS NOTE ADULT - ATTENDING COMMENTS
Patient seen and examined at bedside, feels ok.  Had only 1 BM yesterday after Mg citrate. Plan to place him on a bowel regimen to reach appropriate prep on Monday colonoscopy.   Physical exam:  Vital Signs Last 24 Hrs  T(C): 37.4 (30 Mar 2019 14:00), Max: 37.6 (30 Mar 2019 05:08)  T(F): 99.3 (30 Mar 2019 14:00), Max: 99.7 (30 Mar 2019 05:08)  HR: 81 (30 Mar 2019 14:00) (81 - 102)  BP: 123/80 (30 Mar 2019 14:00) (105/56 - 135/75)  BP(mean): --  RR: 17 (30 Mar 2019 14:00) (16 - 18)  SpO2: 97% (30 Mar 2019 14:00) (94% - 99%)  Abdomen: NT< ND  No LE edema  A/P  1- Abnormal Ct Abdomen:   ? stool vs mass in right ascending colon, Likely stool CEA is negative.  Plan for colonoscopy on Monday  full liquid diet today and clears tomorrow  Mg citrate with dulcolax today    2- Afib: CHADSVASC: 2  Hence anticoagulation interrupted and no bridging started  Risks from bleeding after biopsy outweighs risks of stroke or clotting.    3- Thrombocytopenia:   says is chronic.
Patient seen and examined at bedside, feels ok  CT abdomen with and without IV contrast yesterday could not visualize the colon mass, hence most likely was a stool ball.  Undergone colonoscopy that's pertinent for a cecal ulcer, biopsy done and rule out malignancy  Physical exam:  Vital Signs Last 24 Hrs  T(C): 36.5 (01 Apr 2019 13:48), Max: 36.5 (31 Mar 2019 20:21)  T(F): 97.7 (01 Apr 2019 13:48), Max: 97.7 (31 Mar 2019 20:21)  HR: 106 (01 Apr 2019 13:48) (69 - 106)  BP: 135/67 (01 Apr 2019 13:48) (101/85 - 135/67)  BP(mean): --  RR: 18 (01 Apr 2019 13:48) (16 - 18)  SpO2: 98% (01 Apr 2019 13:48) (98% - 98%)  A/P  1- Abnormal Ct Abdomen:   most likely mass visualized on CT abdomen on admission was stool ball.  CEA is negative.  Colonoscopy done positive for colon polyp, caecal mass.  Oncology input appreciated  GI input appreciated    2- Afib: did not need any bridging as CHADSvasc is 3  Plan to restart anticoagulation tomorrow as per GI.     3- Thrombocytopenia:   says is chronic.
Patient seen and examined at bedside, feels ok  CT chest is negative and CEA is normal  He had big BM today after miralax. Plan to give him Mg Citrate in am, and golytely on Sunday to prep  for colonoscopy on Monday  Physical exam:  Vital Signs Last 24 Hrs  T(C): 36.9 (29 Mar 2019 13:20), Max: 38.1 (28 Mar 2019 21:32)  T(F): 98.5 (29 Mar 2019 13:20), Max: 100.6 (28 Mar 2019 21:32)  HR: 83 (29 Mar 2019 13:20) (83 - 112)  BP: 116/69 (29 Mar 2019 13:20) (109/55 - 119/85)  BP(mean): --  RR: 16 (29 Mar 2019 13:20) (16 - 17)  SpO2: 98% (29 Mar 2019 13:20) (93% - 98%)  A/P  1- Abnormal CT abdomen:   fecal impacted or a mass. CEA is normal, hence it's probably stool.  Continue bowel regimen  Mg citrate Saturday, Golytely on Sunday  Tolerating food.   Oncology input appreciated.     2- Afib:   Hold Coumadin  Chadsvasc is 3, hence risks of bleeding from biopsy outweighs risks of stroke, anticoagulation on hold  [facility]. His coumadin most likely will be started on Monday night.    3- Low platelets:   he states prior knowledge and no reason was given why his platelets were low .
Patient seen and examined at bedside, feels ok  S/p colonoscopy yesterday showing 2 colonic polyps s/p excision and a cecal mass with an ulcer that was not visualized on CT.  Biopsy taken. Discussed with him to follow outpatient with Oncology, GI and Primary care, he understands.  Physical exam:  Vital Signs Last 24 Hrs  T(C): 37.1 (02 Apr 2019 14:56), Max: 37.4 (02 Apr 2019 05:11)  T(F): 98.7 (02 Apr 2019 14:56), Max: 99.4 (02 Apr 2019 05:11)  HR: 74 (02 Apr 2019 14:56) (74 - 113)  BP: 115/65 (02 Apr 2019 14:56) (115/65 - 132/76)  BP(mean): --  RR: 18 (02 Apr 2019 14:56) (17 - 18)  SpO2: 99% (02 Apr 2019 14:56) (96% - 99%)  A/P  1- colonic mass: in the caecum. Also suggesting may be it's ischemic  F.u oncology, Gi and PCP  Reports given to the patient.  2- Afib: INR 2.1  will continue regular coumadin dose and f.u tomorrow with [facility].  3- Incidentaloma: hormonal levels sent.  f.u outpatient.
Patient seen and examined at bedside. Discussed with dr. Faye and since INR is 3, hence plan for Colonoscopy and biopsy on Monday.  He is on Warfarin for Afib, does not need to be bridged since CHADSVASC Is 3.  Physical exam:  Vital Signs Last 24 Hrs  T(C): 36.8 (28 Mar 2019 04:54), Max: 37.7 (28 Mar 2019 02:49)  T(F): 98.2 (28 Mar 2019 04:54), Max: 99.8 (28 Mar 2019 02:49)  HR: 93 (28 Mar 2019 04:54) (89 - 97)  BP: 124/60 (28 Mar 2019 04:54) (122/82 - 156/79)  BP(mean): --  RR: 17 (28 Mar 2019 04:54) (17 - 18)  SpO2: 100% (28 Mar 2019 04:54) (97% - 100%)  Abdomen: abdominal tenderness at the right side  A/P  1- Abnormal CT abdomen:   fecal impacted or a mass.  Continue bowel regimen  will give him Mg citrate today and Saturday, Golytely on Sunday  Soft food if tolerated today, full liquid tomorrow and clears on Sunday    2- Afib:   Hold Coumadin  Chadsvasc is 3, hence risks of bleeding from biopsy outweighs risks of stroke.    3- Low platelets:   he states prior knowledge and no reason was given why his platelets were low

## 2019-04-02 NOTE — PROGRESS NOTE ADULT - PROVIDER SPECIALTY LIST ADULT
Heme/Onc
Heme/Onc
Internal Medicine
Gastroenterology

## 2019-04-02 NOTE — DISCHARGE NOTE NURSING/CASE MANAGEMENT/SOCIAL WORK - NSDCDPATPORTLINK_GEN_ALL_CORE
You can access the [facility] Patient Portal, offered by [facility], by registering with the following website: [email or web address]

## 2019-04-02 NOTE — PROGRESS NOTE ADULT - PROBLEM SELECTOR PROBLEM 5
BPH (benign prostatic hyperplasia)
HTN (hypertension)
BPH (benign prostatic hyperplasia)

## 2019-04-02 NOTE — DISCHARGE NOTE NURSING/CASE MANAGEMENT/SOCIAL WORK - NSDCPEPTCOWAR_GEN_ALL_CORE
Coumadin/Warfarin - Potential for adverse drug reactions and interactions/Coumadin/Warfarin - Dietary Advice/Coumadin/Warfarin - Follow up monitoring/Coumadin/Warfarin - Compliance

## 2019-04-02 NOTE — PROGRESS NOTE ADULT - PROBLEM SELECTOR PLAN 8
Improve VTE score: Goal INR 2-3 with Warfarin , will restart  [ ] Previous VTE                                               3  [ ] Thrombophilia                                             2  [ ] Lower limb paralysis                                   2    [ ] Current Cancer                                           2   [x] Immobilization > 24 hrs                              1  [ ] ICU/CCU stay > 24 hours                            1  [x] Age > 60                                                       1

## 2019-04-02 NOTE — PROGRESS NOTE ADULT - PROBLEM SELECTOR PLAN 6
platelets 71-->63--62--89--79  Continue to monitor  Likely chronic  W/up for thrombocytopenia in progress, for low Vitamin b12 f.up [facility]

## 2019-04-02 NOTE — PROGRESS NOTE ADULT - PROBLEM SELECTOR PROBLEM 3
HTN (hypertension)
Diabetes
HTN (hypertension)

## 2019-04-02 NOTE — PROGRESS NOTE ADULT - PROBLEM SELECTOR PLAN 4
Says is chronic problem.
Holding home Actos  Started HSS  6.3 HbA1c
Holding home Actos  Started HSS  F/u HbA1c
Holding home Actos  Started HSS  6.3 HbA1c

## 2019-04-02 NOTE — PROGRESS NOTE ADULT - PROBLEM SELECTOR PLAN 3
sliding scale as coverage.   6.3 HbA1c
C/w Cardizem and Metoprolol  BP stable

## 2019-04-02 NOTE — PROGRESS NOTE ADULT - REASON FOR ADMISSION
Abdominal pain and vomiting

## 2019-04-03 LAB
ALDOST SERPL-MCNC: 3.6 NG/DL — SIGNIFICANT CHANGE UP
METHYLMALONATE SERPL-SCNC: 211 NMOL/L — SIGNIFICANT CHANGE UP (ref 0–378)

## 2019-04-05 LAB
METANEPHRINE, PL: <10 PG/ML — SIGNIFICANT CHANGE UP (ref 0–62)
NORMETANEPHRINE, PL: 36 PG/ML — SIGNIFICANT CHANGE UP (ref 0–145)

## 2019-04-08 LAB
CORTICOSTEROID BINDING GLOBULIN RESULT: 1.9 MG/DL — SIGNIFICANT CHANGE UP
CORTIS F/TOTAL MFR SERPL: 8.2 % — SIGNIFICANT CHANGE UP
CORTIS SERPL-MCNC: 9.8 UG/DL — SIGNIFICANT CHANGE UP
CORTISOL, FREE RESULT: 0.8 UG/DL — SIGNIFICANT CHANGE UP

## 2019-06-07 PROBLEM — N40.0 BENIGN PROSTATIC HYPERPLASIA WITHOUT LOWER URINARY TRACT SYMPTOMS: Chronic | Status: ACTIVE | Noted: 2019-03-27

## 2019-06-07 PROBLEM — I48.91 UNSPECIFIED ATRIAL FIBRILLATION: Chronic | Status: ACTIVE | Noted: 2019-03-27

## 2019-06-07 PROBLEM — I10 ESSENTIAL (PRIMARY) HYPERTENSION: Chronic | Status: ACTIVE | Noted: 2019-03-27

## 2019-06-10 ENCOUNTER — OUTPATIENT (OUTPATIENT)
Dept: OUTPATIENT SERVICES | Facility: HOSPITAL | Age: 74
LOS: 1 days | End: 2019-06-10
Payer: COMMERCIAL

## 2019-06-10 DIAGNOSIS — I48.0 PAROXYSMAL ATRIAL FIBRILLATION: ICD-10-CM

## 2019-06-10 PROCEDURE — A9502: CPT

## 2019-06-10 PROCEDURE — 93017 CV STRESS TEST TRACING ONLY: CPT

## 2019-06-10 PROCEDURE — 93018 CV STRESS TEST I&R ONLY: CPT

## 2019-06-10 PROCEDURE — 78452 HT MUSCLE IMAGE SPECT MULT: CPT | Mod: 26

## 2019-06-10 PROCEDURE — 93016 CV STRESS TEST SUPVJ ONLY: CPT

## 2019-06-10 PROCEDURE — 78452 HT MUSCLE IMAGE SPECT MULT: CPT

## 2020-01-05 NOTE — PHYSICAL THERAPY INITIAL EVALUATION ADULT - STANDING BALANCE: DYNAMIC, REHAB EVAL
CARDIOLOGY CONSULT NOTE    ADMISSION DATE:  12/28/2019  CONSULTING PHYSICIAN:  Efren Babin MD  ATTENDING PHYSICIAN:  Akila Dietz MD         CHIEF COMPLAINT: Paroxysmal postoperative atrial fibrillation    SUBJECTIVE:    I was asked to see Sal Julian at the request of Dr. Perry Oneill for cardiology consultation. Sal Julian is a 77year old male patient admitted with diverticular abscess. This is a 79-year-old gentleman with known multiple myeloma, COPD, coronary artery disease, obstructive sleep apnea, hyperlipidemia, hypertension, and hypothyroidism. Hospital records have been reviewed in detail and hospital course has been reviewed and case has been discussed with nursing staff. The patient was initially hospitalized about 2 weeks ago for diverticular abscess which was managed initially conservatively with drainage in the interventional radiology department and IV antibiotics. He was discharged to home but returned 3 days later with increasing abdominal pain. The patient underwent exploratory laparotomy on December 30 with sigmoid resection, Curtis's procedure, small bowel resection, appendectomy, and drainage of retroperitoneal abscess. Subsequent hospital course was significant for septic shock and acute respiratory failure. Since January 1, 2020 the patient has had paroxysmal atrial fibrillation and has been treated by the intensive care service with IV amiodarone drip. Despite this the patient has remained in atrial fibrillation with uncontrolled ventricular response. Cardiology consultation has been requested earlier today and I gave a order to start the patient on IV diltiazem drip and stop the amiodarone drip. The patient himself denies chest pain, shortness of breath, or palpitations. He remains n.p.o. due to expected post-operative adynamic ileus. The patient did have an echocardiogram performed November 7, 2017 which showed ejection fraction of 60 to 65%.   No significant valvular abnormalities noted. PROBLEM LIST:    Patient Active Problem List   Diagnosis   â¢ Multiple myeloma (CMS/HCC)   â¢ Sleep apnea   â¢ Hypothyroidism   â¢ Hyperlipidemia   â¢ Hypertension   â¢ Encounter for palliative care   â¢ Acute respiratory failure with hypoxia (CMS/[facility])   â¢ Pain   â¢ Advance care planning   â¢ New onset a-fib (CMS/[facility])       HISTORIES:    Past Medical History:   Diagnosis Date   â¢ BPH (benign prostatic hyperplasia)     Seeing urology for this   â¢ Diverticulitis    â¢ Hyperlipidemia    â¢ Hypertension    â¢ Hypothyroidism    â¢ Multiple myeloma (CMS/HCC)     Used to be on chemotherapy; now on dexamethasone and weaning down with heme/onc   â¢ Pneumonia    â¢ Sleep apnea     Not treated     No past surgical history on file. ALLERGIES: no known allergies. Social History     Tobacco Use   Smoking Status Former Smoker   â¢ Packs/day: 0.00   â¢ Last attempt to quit:    â¢ Years since quittin.0   Smokeless Tobacco Never Used   Family history: No coronary artery disease in parents. Social History     Substance and Sexual Activity   Alcohol Use Not Currently       HOME MEDICATIONS:  Medications Prior to Admission   Medication Sig Dispense Refill   â¢ hydroCORTisone (CORTEF) 20 MG tablet Take 1 tablet by mouth daily (with breakfast). Do not start before 2019. â¢ metroNIDAZOLE (FLAGYL) 500 MG tablet Take 1 tablet by mouth 3 times daily for 16 days. 48 tablet 0   â¢ levoFLOXacin (LEVAQUIN) 750 MG tablet Take 1 tablet by mouth nightly for 16 days. 16 tablet 0   â¢ fluconazole (DIFLUCAN) 200 MG tablet Take 1 tablet by mouth daily for 16 days. 16 tablet 0   â¢ amLODIPine (NORVASC) 10 MG tablet Take 1 tablet by mouth daily. Do not start before 2019. 30 tablet 0   â¢ budesonide-formoterol (SYMBICORT) 80-4.5 MCG/ACT inhaler Inhale 2 puffs into the lungs two times daily.  Do not start before 2019. 10.2 g 0   â¢ sulfamethoxazole-trimethoprim (BACTRIM DS,SEPTRA DS) 800-160 MG per tablet Take 1 tablet by mouth 2 days a week. Monday/ Thursday     â¢ albuterol-ipratropium 2.5 mg/0.5 mg (DUONEB) 0.5-2.5 (3) MG/3ML nebulizer solution Take 3 mLs by nebulization every 4 hours as needed for Wheezing. 270 mL 12   â¢ tamsulosin (FLOMAX) 0.4 MG Cap Take 0.4 mg by mouth every other day. â¢ dutasteride (AVODART) 0.5 MG capsule Take 0.5 mg by mouth every other day. â¢ levothyroxine (SYNTHROID, LEVOTHROID) 25 MCG tablet Take 25 mcg by mouth daily.      â¢ ZOLEDRONIC ACID IV Q 4 weeks         CURRENT MEDICATIONS:  Current Facility-Administered Medications   Medication Dose Route Frequency Provider Last Rate Last Dose   â¢ hydroCORTisone (Solu-CORTEF) PF injection 25 mg  25 mg Intravenous 3 times per day Ailyn Krishnamurthy MD   25 mg at 01/05/20 1431   â¢ dilTIAZem (CARDIZEM) 125 mg/125 mL in sodium chloride 0.9 % infusion  10 mg/hr Intravenous Continuous Vanessa Nguyễn MD 10 mL/hr at 01/05/20 1400 10 mg/hr at 01/05/20 1400   â¢ parenteral nutrition adult custom central   Intravenous Continuous PN Marcos Garcia MD       â¢ potassium phosphate 20 mmol in dextrose 5 % 250 mL IVPB  20 mmol Intravenous Once Ailyn Krishnamurthy MD 62.5 mL/hr at 01/05/20 1605 20 mmol at 01/05/20 1605   â¢ digoxin (LANOXIN) injection 250 mcg  250 mcg Intravenous Q6H Vanessa Nguyễn MD       â¢ heparin (porcine) injection 4,000 Units  4,000 Units Intravenous Once Vanessa Nguyễn MD       â¢ heparin (porcine) 25,000 units/250 mL in dextrose 5 % infusion  0-30 Units/kg/hr (Dosing Weight) Intravenous Continuous Vanessa Nguyễn MD       â¢ heparin (porcine) injection 4,000 Units  4,000 Units Intravenous PRN Vanessa Nguyễn MD       â¢ heparin (porcine) injection 2,000 Units  2,000 Units Intravenous PRN Vanessa Nguyễn MD       â¢ parenteral nutrition adult custom central   Intravenous Continuous PN Marcel Guerra CNP 83.3 mL/hr at 01/05/20 1400     â¢ dextrose 10 % infusion  1,000 mL Intravenous Continuous PRN Marcel Guerra, PRADEEP       â¢ metoPROLOL (LOPRESSOR) injection 5 mg  5 mg Intravenous Q2H PRN Simone Herbert CNP   5 mg at 01/05/20 0915   â¢ fat emulsion 20 % injection 250 mL  250 mL Intravenous Q24H Simone Herbert CNP   Stopped at 01/05/20 1019   â¢ levothyroxine (SYNTHROID) injection 20 mcg  20 mcg Intravenous QAM Ousmane Patel MD   20 mcg at 01/05/20 5435   â¢ sodium chloride 0.9 % 1,000 mL with potassium CHLORIDE 40 mEq infusion   Intravenous Continuous Ousmane Patel MD   Stopped at 01/02/20 2000   â¢ famotidine (PEPCID) injection 20 mg  20 mg Intravenous 2 times per day Garland Peña MD   20 mg at 01/05/20 0856   â¢ meropenem (MERREM) 500 mg in sodium chloride 0.9 % 100 mL IVPB  500 mg Intravenous 4 times per day Claudetta Lions, MD   Stopped at 01/05/20 1150   â¢ dextrose 50 % injection 25 g  25 g Intravenous PRN Laura Crump MD       â¢ dextrose 50 % injection 12.5 g  12.5 g Intravenous PRN Laura Crump MD       â¢ dextrose 5 % infusion   Intravenous Continuous PRN Laura Crump MD       â¢ glucagon [facility]) injection 1 mg  1 mg Intramuscular PRN Laura Crump MD       â¢ insulin lispro (HumaLOG) correction dose   Subcutaneous 4 times per day Laura Crump MD   4 Units at 01/05/20 1217   â¢ sodium chloride 0.9% infusion   Intravenous Continuous Ousmane Patel MD   Stopped at 01/05/20 1219   â¢ HYDROmorphone (DILAUDID) injection 0.5 mg  0.5 mg Intravenous Q4H PRN Davi Abbasi MD   0.5 mg at 01/05/20 0051   â¢ budesonide-formoterol (SYMBICORT) 80-4.5 MCG/ACT inhaler 2 puff  2 puff Inhalation BID Resp Yaquelin Suazo MD   2 puff at 01/05/20 0902   â¢ albuterol-ipratropium 2.5 mg/0.5 mg (DUONEB) nebulizer solution 3 mL  3 mL Nebulization Q4H Resp PRN Yaquelin Suazo MD       â¢ fluconazole (DIFLUCAN) IVPB 200 mg  200 mg Intravenous Q24H Claudetta Lions, MD   Stopped at 01/05/20 1321         REVIEW OF SYSTEMS:    Constitutional:  Patient denies fever, chills, tiredness or malaise. "  Eyes:  Denies change in visual acuity, pain, burning or itching. Immunologic:  Denies hives, seasonal allergies. HENT:  Denies sinus problems, ear infections, nasal congestion or sore throat. Respiratory:  Denies cough, shortness of breath. Cardiovascular:  Denies chest pain, edema. Gastrointestinal:  Denies abdominal pain, nausea, vomiting, bloody stools or diarrhea. Genitourinary:  Denies urine retention, painful urination, urinary frequency, blood in urine or nocturia. Musculoskeletal:  Denies back pain, neck pain, joint pain or leg swelling. Integument:  Denies rash, itching. Neurologic:  Denies headache, focal weakness or sensory changes. Endocrine:  Denies polyuria, polydipsia or temperature intolerance. Lymphatic:  Denies swollen glands, weight loss. Psychiatric:  Denies anxiety, depression, hallucinations, irritability or sleeping problems.       OBJECTIVE:      VITAL SIGNS:    Vital Last Value 24 Hour Range   Temperature 97 Â°F (36.1 Â°C) (01/05/20 1130) Temp  Min: 97 Â°F (36.1 Â°C)  Max: 98.2 Â°F (36.8 Â°C)   Pulse 92 (01/05/20 1130) Pulse  Min: 92  Max: 124   Respiratory 20 (01/05/20 1130) Resp  Min: 18  Max: 23   Non-Invasive  Blood Pressure 116/74 (01/05/20 1130) BP  Min: 116/74  Max: 137/81   Pulse Oximetry 94 % (01/05/20 1130) SpO2  Min: 94 %  Max: 98 %     Vital Today Admitted   Weight 75.1 kg (165 lb 9.1 oz) (01/05/20 0600) Weight: 90.7 kg (200 lb) (12/28/19 0844)   Height N/A Height: 5' 6"" (167.6 cm) (12/28/19 0844)   Body Mass Index N/A BMI (Calculated): 32.28 (12/28/19 0844)     INTAKE/OUTPUT:      Intake/Output Summary (Last 24 hours) at 1/5/2020 1615  Last data filed at 1/5/2020 1400  Gross per 24 hour   Intake 4782.23 ml   Output 4700 ml   Net 82.23 ml        PHYSICAL EXAM:    Vital Signs:    Visit Vitals  /74 (BP Location: RUE - Right upper extremity, Patient Position: Sitting)   Pulse 92   Temp 97 Â°F (36.1 Â°C) (Temporal)   Resp 20   Ht 5' 6\"" (1.676 m) " Wt 75.1 kg (165 lb 9.1 oz)   SpO2 94%   BMI 26.72 kg/mÂ²     Pulse Ox Interpretation:  Within normal limits. General:  Alert, cooperative, conversive. No acute distress. Skin:  Warm and dry without rash. Head:  Normocephalic-atraumatic. Neck:  Trachea is midline. No adenopathy. Normal thyroid without mass or tenderness. No JVD or hepatojugular reflux, carotid upstrokes 2+, no bruits. Eyes:  Normal conjunctivae but with pale sclerae. Extraocular movements intact. ENT:  Mucous membranes are moist. No facial tenderness. Normal nasal mucosa. Cardiovascular:  Symmetrical pulses. Normal S1-S2, no S3, regular, rate and rhythm without murmur or rub. Point of maximal impulse is not well localized. No leg edema. Chest: Symmetrical without tenderness to palpation or chest pain with deep breathing or arm movement. Respiratory:  Normal respiratory effort. Clear to auscultation. No wheezes, rales or rhonchi. Gastrointestinal:  Soft and nontender. Absent bowel sounds. No hepatomegaly or splenomegaly. Ostomy noted. Musculoskeletal:  No deformity or edema. No tenderness to palpation. Back:   Normal alignment. No costovertebral angle tenderness or midline bony tenderness. Neurologic:   Oriented times 4. Cranial nerves 2-12 are intact. No focal deficits or lateralizing signs. Psychiatric:   Cooperative. Appropriate mood and affect.         LABORATORY DATA:    Recent Results (from the past 24 hour(s))   Metered blood glucose    Collection Time: 01/04/20  5:52 PM   Result Value Ref Range    Glucose Bedside  (H) 70 - 99 mg/dL   TRIGLYCERIDE    Collection Time: 01/04/20  9:02 PM   Result Value Ref Range    TRIGLYCERIDE 148 <150 mg/dL   Potassium Level    Collection Time: 01/04/20 10:10 PM   Result Value Ref Range    Potassium 2.5 (LL) 3.4 - 5.1 mmol/L   Magnesium    Collection Time: 01/04/20 10:10 PM   Result Value Ref Range    MAGNESIUM 1.6 (L) 1.7 - 2.4 mg/dL   Metered blood glucose    Collection Time: 01/05/20 12:01 AM   Result Value Ref Range    Glucose Bedside  (H) 70 - 99 mg/dL   Magnesium    Collection Time: 01/05/20  4:26 AM   Result Value Ref Range    MAGNESIUM 2.1 1.7 - 2.4 mg/dL   Basic Metabolic Panel    Collection Time: 01/05/20  4:26 AM   Result Value Ref Range    Sodium 141 135 - 145 mmol/L    Potassium 3.5 3.4 - 5.1 mmol/L    Chloride 106 98 - 107 mmol/L    Carbon Dioxide 30 21 - 32 mmol/L    Anion Gap 9 (L) 10 - 20 mmol/L    Glucose 224 (H) 65 - 99 mg/dL    BUN 21 (H) 6 - 20 mg/dL    Creatinine 0.50 (L) 0.67 - 1.17 mg/dL    GFR Estimate,  >90     GFR Estimate, Non African American >90     BUN/Creatinine Ratio 42 (H) 7 - 25    CALCIUM 6.8 (L) 8.4 - 10.2 mg/dL   Phosphorus    Collection Time: 01/05/20  4:26 AM   Result Value Ref Range    PHOSPHORUS 2.2 (L) 2.4 - 4.7 mg/dL   CBC with Automated Differential    Collection Time: 01/05/20  4:26 AM   Result Value Ref Range    WBC 11.0 4.2 - 11.0 K/mcL    RBC 3.22 (L) 4.50 - 5.90 mil/mcL    HGB 9.4 (L) 13.0 - 17.0 g/dL    HCT 29.1 (L) 39.0 - 51.0 %    MCV 90.4 78.0 - 100.0 fl    MCH 29.2 26.0 - 34.0 pg    MCHC 32.3 32.0 - 36.5 g/dL    RDW-CV 17.3 (H) 11.0 - 15.0 %     140 - 450 K/mcL    NRBC 1 (H) 0 /100 WBC    DIFF TYPE MANUAL DIFFERENTIAL     SEG 87 %    LYMPH 5 %    REACTIVE LYMPH 1 0 - 5 %    MONO 5 %    EOS 0 %    BASO 0 %    META 2 0 - 2 %    Absolute Neut 9.6 (H) 1.8 - 7.7 K/mcL    Absolute Lymph 0.7 (L) 1.0 - 4.0 K/mcL    Absolute Mono 0.6 0.3 - 0.9 K/mcL    Absolute Eos 0.0 (L) 0.1 - 0.5 K/mcL    Absolute Baso 0.0 0.0 - 0.3 K/mcL    WBC MORPHOLOGY NORMAL NORMAL    PLATELETS APPEAR NORMAL NORMAL    Hypochromia FEW     Polychromasia FEW    Metered blood glucose    Collection Time: 01/05/20  5:59 AM   Result Value Ref Range    Glucose Bedside  (H) 70 - 99 mg/dL   Potassium    Collection Time: 01/05/20 10:11 AM   Result Value Ref Range    Potassium 3.1 (L) 3.4 - 5.1 mmol/L   Metered blood glucose    Collection Time: 01/05/20 11:37 AM   Result Value Ref Range    Glucose Bedside  (H) 70 - 99 mg/dL         IMAGING STUDIES:    LAST X-RAY:  12/28/19   XR CHEST PA OR AP 1 VIEW  Narrative  EXAM: XR CHEST PA OR AP 1 VIEWCLINICAL INDICATION: Verify PICC line placement. COMPARISON: None. Impression  FINDINGS AND IMPRESSION: Left arm PICC line now present with tip which appears to terminate overlying the SVC. Persistent left-sided pleural effusion with obscuration of the left lung base. Right chest port tip terminates overlying the SVC. Endotracheal tube has been removed. Nasogastric tube projects in stable position. Electronically Signed by: Jeanne Hanson M.D. Signed on: 1/3/2020 10:54 PM      XR CHEST PA OR AP 1 VIEW  Narrative  EXAM: XR CHEST PA OR AP 1 VIEWCLINICAL INDICATION: HypoxiaCOMPARISON: 12/31/2019  Impression  FINDINGS AND IMPRESSION: Portable frontal view of the chest.  ET tube, NG tube and right-sided Port-A-Cath are stable and well-positioned. Borderline cardiomegaly with extensive streaky and patchy perihilar opacities compatible with atelectasis and crowded central bronchovascular structures. Small left pleural effusion. Bibasilar subsegmental atelectasis. No detectable pneumothorax. Electronically Signed by: Saumya Mercer M.D. Signed on: 1/1/2020 1:46 PM      XR CHEST PA OR AP 1 VIEW  Narrative  EXAM: XR CHEST PA OR AP 1 VIEWCLINICAL INDICATION: Hypoxemia. COMPARISON: 12/30/2019. Impression  FINDINGS AND IMPRESSION: Chest one view. Right-sided port catheter tip projects over the superior vena cava. Endotracheal tube tip projects below the thoracic inlet and above the arcenio. Enteric tube passes below the diaphragm with tip projected over the stomach. Significantly low lung volumes with magnification of the cardiomediastinal silhouette. Patchy and linear perihilar and bibasilar opacities likely represent atelectasis. Cannot exclude a small left pleural effusion.   No discrete pneumothorax. Electronically Signed by: Jeri Mccarty MD Signed on: 12/31/2019 2:31 PM     ASSESSMENT:     1. Paroxysmal atrial fibrillation most probably due to postoperative septic shock, hypertensive heart disease, COPD, and atelectasis. CHADs-VASc score is at least 4.    2.  Diverticular abscess and subsequent drainage with colostomy formation. 3.  Septic shock which is resolving. 4.  Postoperative anemia which is expected. 5.  Hypertension. 6.  Coronary artery disease, clinically stable. 7.  Hyperlipidemia. 8.  COPD.  9.  Obstructive sleep apnea. PLAN:    1.  I have discontinued IV amiodarone as it is not a adequate rate control medication and takes many days up to 2 to 3 weeks to load fully. 2.  I recommend IV diltiazem bolus and drip which was started earlier today and since then the patient has remained in sinus rhythm. 3.  I will go ahead and also load the patient with IV digoxin for a total of 1000 mcg given over 4 doses. 4.  Once IV digoxin loading dose is finished patient will be continued on digoxin 125 mcg daily. 5.  Although in my opinion the patient's atrial fibrillation is probably due a reversible cause which should resolve once the patient has fully recovered. It is reasonable however, to start the patient on bridging anticoagulation with IV heparin and consider anticoagulation for period of time afterwards. I have already obtained clearance from general surgery will start the patient on IV heparin drip per pharmacy protocol. Thank you for allowing me to participate in this patient's cardiovascular evaluation and care, I shall follow with you. I spent 70 minutes on the unit, over half of which was in face-to-face discussion and coordination of care on the unit.                 Saumya Madsen MD good balance

## 2020-07-07 NOTE — PHYSICAL THERAPY INITIAL EVALUATION ADULT - CRITERIA FOR SKILLED THERAPEUTIC INTERVENTIONS
Pt. presents without gross impairment and is independent with functional mobility. Skilled therapeutic PT intervention not indicated at this time. Pt. will not be placed on PT program. Brother - SEE SEPARATE MD NOTES FOR DETAILS PERTAINING TO COLLATERAL INFORMATION GATHERED

## 2021-06-11 NOTE — PATIENT PROFILE ADULT - NSSTREETDRUGUSE_GEN_A_NUR
6/11/21 11:44 AM  Héctor PALMA contacted Me    Me     RB    6/11/21 11:49 AM  Note     Pt called stating that hes back in a fib. Pt is currently taking amiodarone 200 mg 1 po every day. I spoke to dr Subha Agarwal regarding. Per dr Subha Agarwal: pt needs to take amiodarone 200 mg 1 po bid, bring pt in for visit and ekg NEXT week. appt time 2:30 pm 6/15/21  Attempted to contact pt at  number, no answer. Lvm for pt to return call to office at [phone]. Will continue to try to contact pt. never used

## 2023-09-05 PROBLEM — Z00.00 ENCOUNTER FOR PREVENTIVE HEALTH EXAMINATION: Status: ACTIVE | Noted: 2023-09-05

## 2023-09-06 ENCOUNTER — APPOINTMENT (OUTPATIENT)
Dept: GASTROENTEROLOGY | Facility: CLINIC | Age: 78
End: 2023-09-06

## 2025-04-23 NOTE — ED PROVIDER NOTE - OBTAINED AND REVIEWED OLD RECORDS MULTI-SELECT OPTIONS
Other Records (please specify)